# Patient Record
Sex: MALE | Race: BLACK OR AFRICAN AMERICAN | Employment: OTHER | URBAN - METROPOLITAN AREA
[De-identification: names, ages, dates, MRNs, and addresses within clinical notes are randomized per-mention and may not be internally consistent; named-entity substitution may affect disease eponyms.]

---

## 2019-05-24 ENCOUNTER — APPOINTMENT (OUTPATIENT)
Dept: GENERAL RADIOLOGY | Age: 74
End: 2019-05-24
Attending: EMERGENCY MEDICINE
Payer: MEDICARE

## 2019-05-24 ENCOUNTER — HOSPITAL ENCOUNTER (EMERGENCY)
Age: 74
Discharge: HOME | End: 2019-05-24
Attending: EMERGENCY MEDICINE
Payer: MEDICARE

## 2019-05-24 VITALS
DIASTOLIC BLOOD PRESSURE: 83 MMHG | BODY MASS INDEX: 25.77 KG/M2 | WEIGHT: 180 LBS | TEMPERATURE: 98.2 F | SYSTOLIC BLOOD PRESSURE: 135 MMHG | HEART RATE: 88 BPM | OXYGEN SATURATION: 95 % | RESPIRATION RATE: 18 BRPM | HEIGHT: 70 IN

## 2019-05-24 DIAGNOSIS — J18.9 ATYPICAL PNEUMONIA: Primary | ICD-10-CM

## 2019-05-24 LAB
ALBUMIN SERPL-MCNC: 4 G/DL (ref 3.2–4.6)
ALBUMIN/GLOB SERPL: 1.3 {RATIO} (ref 1.2–3.5)
ALP SERPL-CCNC: 86 U/L (ref 50–136)
ALT SERPL-CCNC: 45 U/L (ref 12–65)
ANION GAP SERPL CALC-SCNC: 9 MMOL/L (ref 7–16)
AST SERPL-CCNC: 34 U/L (ref 15–37)
ATRIAL RATE: 119 BPM
BASOPHILS # BLD: 0 K/UL (ref 0–0.2)
BASOPHILS NFR BLD: 0 % (ref 0–2)
BILIRUB SERPL-MCNC: 1.2 MG/DL (ref 0.2–1.1)
BNP SERPL-MCNC: 12 PG/ML
BUN SERPL-MCNC: 13 MG/DL (ref 8–23)
CALCIUM SERPL-MCNC: 8.7 MG/DL (ref 8.3–10.4)
CALCULATED P AXIS, ECG09: 64 DEGREES
CALCULATED R AXIS, ECG10: 69 DEGREES
CALCULATED T AXIS, ECG11: 54 DEGREES
CHLORIDE SERPL-SCNC: 108 MMOL/L (ref 98–107)
CO2 SERPL-SCNC: 25 MMOL/L (ref 21–32)
CREAT SERPL-MCNC: 0.9 MG/DL (ref 0.8–1.5)
D DIMER PPP FEU-MCNC: 0.42 UG/ML(FEU)
DIAGNOSIS, 93000: NORMAL
DIFFERENTIAL METHOD BLD: ABNORMAL
EOSINOPHIL # BLD: 0.2 K/UL (ref 0–0.8)
EOSINOPHIL NFR BLD: 2 % (ref 0.5–7.8)
ERYTHROCYTE [DISTWIDTH] IN BLOOD BY AUTOMATED COUNT: 12.4 % (ref 11.9–14.6)
GLOBULIN SER CALC-MCNC: 3.2 G/DL (ref 2.3–3.5)
GLUCOSE SERPL-MCNC: 116 MG/DL (ref 65–100)
HCT VFR BLD AUTO: 45.8 % (ref 41.1–50.3)
HGB BLD-MCNC: 15.4 G/DL (ref 13.6–17.2)
IMM GRANULOCYTES # BLD AUTO: 0.1 K/UL (ref 0–0.5)
IMM GRANULOCYTES NFR BLD AUTO: 0 % (ref 0–5)
LACTATE BLD-SCNC: 1.64 MMOL/L (ref 0.5–1.9)
LYMPHOCYTES # BLD: 3.1 K/UL (ref 0.5–4.6)
LYMPHOCYTES NFR BLD: 23 % (ref 13–44)
MCH RBC QN AUTO: 33 PG (ref 26.1–32.9)
MCHC RBC AUTO-ENTMCNC: 33.6 G/DL (ref 31.4–35)
MCV RBC AUTO: 98.1 FL (ref 79.6–97.8)
MONOCYTES # BLD: 0.9 K/UL (ref 0.1–1.3)
MONOCYTES NFR BLD: 7 % (ref 4–12)
NEUTS SEG # BLD: 9.4 K/UL (ref 1.7–8.2)
NEUTS SEG NFR BLD: 68 % (ref 43–78)
NRBC # BLD: 0 K/UL (ref 0–0.2)
P-R INTERVAL, ECG05: 178 MS
PLATELET # BLD AUTO: 177 K/UL (ref 150–450)
PMV BLD AUTO: 9.5 FL (ref 9.4–12.3)
POTASSIUM SERPL-SCNC: 3.7 MMOL/L (ref 3.5–5.1)
PROT SERPL-MCNC: 7.2 G/DL (ref 6.3–8.2)
Q-T INTERVAL, ECG07: 324 MS
QRS DURATION, ECG06: 80 MS
QTC CALCULATION (BEZET), ECG08: 455 MS
RBC # BLD AUTO: 4.67 M/UL (ref 4.23–5.6)
SODIUM SERPL-SCNC: 142 MMOL/L (ref 136–145)
TROPONIN I BLD-MCNC: 0 NG/ML (ref 0.02–0.05)
VENTRICULAR RATE, ECG03: 119 BPM
WBC # BLD AUTO: 13.7 K/UL (ref 4.3–11.1)

## 2019-05-24 PROCEDURE — 96365 THER/PROPH/DIAG IV INF INIT: CPT | Performed by: EMERGENCY MEDICINE

## 2019-05-24 PROCEDURE — 71045 X-RAY EXAM CHEST 1 VIEW: CPT

## 2019-05-24 PROCEDURE — 87040 BLOOD CULTURE FOR BACTERIA: CPT

## 2019-05-24 PROCEDURE — 83605 ASSAY OF LACTIC ACID: CPT

## 2019-05-24 PROCEDURE — 74011000258 HC RX REV CODE- 258: Performed by: EMERGENCY MEDICINE

## 2019-05-24 PROCEDURE — 85379 FIBRIN DEGRADATION QUANT: CPT

## 2019-05-24 PROCEDURE — 85025 COMPLETE CBC W/AUTO DIFF WBC: CPT

## 2019-05-24 PROCEDURE — 93005 ELECTROCARDIOGRAM TRACING: CPT | Performed by: EMERGENCY MEDICINE

## 2019-05-24 PROCEDURE — 74011250637 HC RX REV CODE- 250/637: Performed by: EMERGENCY MEDICINE

## 2019-05-24 PROCEDURE — 83880 ASSAY OF NATRIURETIC PEPTIDE: CPT

## 2019-05-24 PROCEDURE — 74011250636 HC RX REV CODE- 250/636: Performed by: EMERGENCY MEDICINE

## 2019-05-24 PROCEDURE — 80053 COMPREHEN METABOLIC PANEL: CPT

## 2019-05-24 PROCEDURE — 96368 THER/DIAG CONCURRENT INF: CPT | Performed by: EMERGENCY MEDICINE

## 2019-05-24 PROCEDURE — 84484 ASSAY OF TROPONIN QUANT: CPT

## 2019-05-24 PROCEDURE — 99285 EMERGENCY DEPT VISIT HI MDM: CPT | Performed by: EMERGENCY MEDICINE

## 2019-05-24 RX ORDER — DOXYCYCLINE 100 MG/1
100 CAPSULE ORAL
Status: COMPLETED | OUTPATIENT
Start: 2019-05-24 | End: 2019-05-24

## 2019-05-24 RX ORDER — LEVOTHYROXINE SODIUM 100 UG/1
50 TABLET ORAL
COMMUNITY

## 2019-05-24 RX ORDER — ALBUTEROL SULFATE 90 UG/1
2 AEROSOL, METERED RESPIRATORY (INHALATION)
Qty: 1 INHALER | Refills: 0 | Status: SHIPPED | OUTPATIENT
Start: 2019-05-24 | End: 2020-04-06

## 2019-05-24 RX ORDER — DOXYCYCLINE HYCLATE 100 MG
100 TABLET ORAL 2 TIMES DAILY
Qty: 20 TAB | Refills: 0 | Status: SHIPPED | OUTPATIENT
Start: 2019-05-24 | End: 2019-06-03

## 2019-05-24 RX ADMIN — DOXYCYCLINE HYCLATE 100 MG: 100 CAPSULE ORAL at 21:20

## 2019-05-24 RX ADMIN — AZITHROMYCIN DIHYDRATE 500 MG: 500 INJECTION, POWDER, LYOPHILIZED, FOR SOLUTION INTRAVENOUS at 21:27

## 2019-05-24 RX ADMIN — CEFTRIAXONE SODIUM 2 G: 2 INJECTION, POWDER, FOR SOLUTION INTRAMUSCULAR; INTRAVENOUS at 21:20

## 2019-05-24 NOTE — ED TRIAGE NOTES
Pt in via EMS from home with complaints of SOB and feeling sick with cough and weakness for a few days. Solumedrol, abuterol, combivent en route.

## 2019-05-24 NOTE — ED PROVIDER NOTES
The history is provided by the patient. Shortness of Breath   This is a new problem. The average episode lasts 4 days. The problem occurs continuously. The problem has been rapidly worsening. Associated symptoms include coryza, rhinorrhea, sore throat, cough, sputum production, chest pain (sharp constant pain for 4 days) and leg swelling. Pertinent negatives include no fever, no neck pain, no hemoptysis, no wheezing, no PND, no orthopnea, no vomiting, no rash and no leg pain. He has tried beta-agonist inhalers for the symptoms. The treatment provided significant relief. He has had no prior hospitalizations. He has had no prior ED visits. He has had no prior ICU admissions. Associated medical issues do not include asthma, COPD, pneumonia, chronic lung disease, PE, CAD, heart failure, past MI, DVT or recent surgery. Past Medical History:   Diagnosis Date    Hypercholesterolemia     Hypertension     Hypothyroid        History reviewed. No pertinent surgical history. History reviewed. No pertinent family history.     Social History     Socioeconomic History    Marital status: Not on file     Spouse name: Not on file    Number of children: Not on file    Years of education: Not on file    Highest education level: Not on file   Occupational History    Not on file   Social Needs    Financial resource strain: Not on file    Food insecurity:     Worry: Not on file     Inability: Not on file    Transportation needs:     Medical: Not on file     Non-medical: Not on file   Tobacco Use    Smoking status: Former Smoker    Smokeless tobacco: Former User   Substance and Sexual Activity    Alcohol use: Not on file    Drug use: Not on file    Sexual activity: Not on file   Lifestyle    Physical activity:     Days per week: Not on file     Minutes per session: Not on file    Stress: Not on file   Relationships    Social connections:     Talks on phone: Not on file     Gets together: Not on file Attends Sikh service: Not on file     Active member of club or organization: Not on file     Attends meetings of clubs or organizations: Not on file     Relationship status: Not on file    Intimate partner violence:     Fear of current or ex partner: Not on file     Emotionally abused: Not on file     Physically abused: Not on file     Forced sexual activity: Not on file   Other Topics Concern    Not on file   Social History Narrative    Not on file         ALLERGIES: Patient has no known allergies. Review of Systems   Constitutional: Negative for chills and fever. HENT: Positive for congestion, rhinorrhea and sore throat. Respiratory: Positive for cough, sputum production and shortness of breath. Negative for hemoptysis and wheezing. Cardiovascular: Positive for chest pain (sharp constant pain for 4 days) and leg swelling. Negative for orthopnea and PND. Gastrointestinal: Negative for nausea and vomiting. Endocrine: Negative for polyuria. Genitourinary: Negative for dysuria. Musculoskeletal: Negative for back pain and neck pain. Skin: Negative for rash. Neurological: Negative for weakness and numbness. Psychiatric/Behavioral: Negative for confusion. The patient is nervous/anxious. Vitals:    05/24/19 1910 05/24/19 1934   BP: 137/72    Pulse: 99    Resp: 19    Temp: 99.3 °F (37.4 °C)    SpO2: 99% 97%   Weight: 81.6 kg (180 lb)    Height: 5' 10\" (1.778 m)             Physical Exam   Constitutional: He is oriented to person, place, and time. He appears well-developed and well-nourished. He appears distressed (tearful and mildly anxious). HENT:   Mouth/Throat: Oropharynx is clear and moist. No oropharyngeal exudate. Eyes: Pupils are equal, round, and reactive to light. Conjunctivae are normal.   Neck: Neck supple. Cardiovascular: Normal rate, regular rhythm and normal heart sounds. Pulmonary/Chest: Effort normal. No accessory muscle usage. No respiratory distress.  He has rales in the right lower field and the left lower field. Abdominal: Soft. Bowel sounds are normal. He exhibits no distension. There is no tenderness. There is no rebound and no guarding. Musculoskeletal: Normal range of motion. He exhibits no edema or tenderness. Right lower leg: Normal. He exhibits no edema. Left lower leg: Normal. He exhibits no edema. Lymphadenopathy:     He has no cervical adenopathy. Neurological: He is alert and oriented to person, place, and time. Skin: Skin is warm and dry. No rash noted. Nursing note and vitals reviewed. MDM  Number of Diagnoses or Management Options  Diagnosis management comments: Quit smoking 20 years ago and was a less than a pack per day smoker off and on for an unspecified number of years. Currently no wheezing. EMS did not tell us that he was wheezing or hypoxic upon arrival and it is not in their note. Patient's vital signs stable currently although mildly hypertensive. Chest x-ray to evaluate for pneumonia or heart failure. Likely just bronchitis possibly with some bronchospasm prior to arrival.  He states he suddenly cannot breathe through his mouth or his nose. Symptoms improved. 8:54 PM  Chest x-ray consistent with a atypical or viral pneumonia. We will cover with doxycycline for pneumonia and atypicals. Albuterol inhaler for wheezing.   D-dimer normal.  Troponin and BNP normal.       Amount and/or Complexity of Data Reviewed  Clinical lab tests: ordered and reviewed (Results for orders placed or performed during the hospital encounter of 05/24/19  -CBC WITH AUTOMATED DIFF       Result                      Value             Ref Range           WBC                         13.7 (H)          4.3 - 11.1 K*       RBC                         4.67              4.23 - 5.6 M*       HGB                         15.4              13.6 - 17.2 *       HCT                         45.8              41.1 - 50.3 %       MCV 98.1 (H)          79.6 - 97.8 *       MCH                         33.0 (H)          26.1 - 32.9 *       MCHC                        33.6              31.4 - 35.0 *       RDW                         12.4              11.9 - 14.6 %       PLATELET                    177               150 - 450 K/*       MPV                         9.5               9.4 - 12.3 FL       ABSOLUTE NRBC               0.00              0.0 - 0.2 K/*       DF                          AUTOMATED                             NEUTROPHILS                 68                43 - 78 %           LYMPHOCYTES                 23                13 - 44 %           MONOCYTES                   7                 4.0 - 12.0 %        EOSINOPHILS                 2                 0.5 - 7.8 %         BASOPHILS                   0                 0.0 - 2.0 %         IMMATURE GRANULOCYTES       0                 0.0 - 5.0 %         ABS. NEUTROPHILS            9.4 (H)           1.7 - 8.2 K/*       ABS. LYMPHOCYTES            3.1               0.5 - 4.6 K/*       ABS. MONOCYTES              0.9               0.1 - 1.3 K/*       ABS. EOSINOPHILS            0.2               0.0 - 0.8 K/*       ABS. BASOPHILS              0.0               0.0 - 0.2 K/*       ABS. IMM.  GRANS.            0.1               0.0 - 0.5 K/*  -METABOLIC PANEL, COMPREHENSIVE       Result                      Value             Ref Range           Sodium                      142               136 - 145 mm*       Potassium                   3.7               3.5 - 5.1 mm*       Chloride                    108 (H)           98 - 107 mmo*       CO2                         25                21 - 32 mmol*       Anion gap                   9                 7 - 16 mmol/L       Glucose                     116 (H)           65 - 100 mg/*       BUN                         13                8 - 23 MG/DL        Creatinine                  0.90              0.8 - 1.5 MG*       GFR est AA >60               >60 ml/min/1*       GFR est non-AA              >60               >60 ml/min/1*       Calcium                     8.7               8.3 - 10.4 M*       Bilirubin, total            1.2 (H)           0.2 - 1.1 MG*       ALT (SGPT)                  45                12 - 65 U/L         AST (SGOT)                  34                15 - 37 U/L         Alk.  phosphatase            86                50 - 136 U/L        Protein, total              7.2               6.3 - 8.2 g/*       Albumin                     4.0               3.2 - 4.6 g/*       Globulin                    3.2               2.3 - 3.5 g/*       A-G Ratio                   1.3               1.2 - 3.5      -BNP       Result                      Value             Ref Range           BNP                         12                pg/mL          -D DIMER       Result                      Value             Ref Range           D DIMER                     0.42              <0.56 ug/ml(*  -POC TROPONIN-I       Result                      Value             Ref Range           Troponin-I (POC)            0 (L)             0.02 - 0.05 *  -EKG, 12 LEAD, INITIAL       Result                      Value             Ref Range           Ventricular Rate            119               BPM                 Atrial Rate                 119               BPM                 P-R Interval                178               ms                  QRS Duration                80                ms                  Q-T Interval                324               ms                  QTC Calculation (Bezet)     455               ms                  Calculated P Axis           64                degrees             Calculated R Axis           69                degrees             Calculated T Axis           54                degrees             Diagnosis                                                     Sinus tachycardia   Otherwise normal ECG   When compared with ECG of 24-MAY-2019 19:51,   No significant change was found   Confirmed by Vinh Rebolledo ([id number]) on 5/24/2019 8:33:49 PM     )  Tests in the radiology section of CPT®: ordered and reviewed (Xr Chest Port    Result Date: 5/24/2019  AP chest radiograph History: sob, 76 years Male Comparison: None available Findings:   Normal cardiomediastinal silhouette. Surgical chain sutures are seen overlying the right lung base. Mild subsegmental atelectasis bilateral lung bases. Nonspecific mild diffuse interstitial prominence. Probable trace right pleural effusion. No evidence of pneumothorax, or air space consolidation. Chronic healed fracture deformity of a right mid posterior lateral rib. Visualized soft tissue and osseous structures otherwise unremarkable.       Impression:  Trace right pleural effusion with nonspecific mild diffuse interstitial prominence.     )           Procedures

## 2019-05-25 LAB
ATRIAL RATE: 106 BPM
CALCULATED P AXIS, ECG09: 71 DEGREES
CALCULATED R AXIS, ECG10: 66 DEGREES
CALCULATED T AXIS, ECG11: 55 DEGREES
DIAGNOSIS, 93000: NORMAL
P-R INTERVAL, ECG05: 200 MS
Q-T INTERVAL, ECG07: 350 MS
QRS DURATION, ECG06: 76 MS
QTC CALCULATION (BEZET), ECG08: 464 MS
VENTRICULAR RATE, ECG03: 106 BPM

## 2019-05-25 NOTE — ED NOTES
I have reviewed discharge instructions with the patient. The patient verbalized understanding. Patient left ED via Discharge Method: ambulatory to Home with  wife). Opportunity for questions and clarification provided. Patient given 2 scripts. To continue your aftercare when you leave the hospital, you may receive an automated call from our care team to check in on how you are doing. This is a free service and part of our promise to provide the best care and service to meet your aftercare needs.  If you have questions, or wish to unsubscribe from this service please call [phone]. Thank you for Choosing our [facility] Emergency Department.

## 2019-05-25 NOTE — DISCHARGE INSTRUCTIONS
Patient Education   Tylenol and ibuprofen for any pains or fevers if needed. Increase fluids. Doxycycline twice daily starting tomorrow for 10 days. Albuterol 2 puffs with spacer chamber every 4 hours as needed for cough wheezing and trouble breathing. Return if any new, worsening or concerning symptoms. Follow-up in 4-5 days if not improving with your primary care doctor. Cough may persist for several weeks. Pneumonia: Care Instructions  Your Care Instructions    Pneumonia is an infection of the lungs. Most cases are caused by infections from bacteria or viruses. Pneumonia may be mild or very severe. If it is caused by bacteria, you will be treated with antibiotics. It may take a few weeks to a few months to recover fully from pneumonia, depending on how sick you were and whether your overall health is good. Follow-up care is a key part of your treatment and safety. Be sure to make and go to all appointments, and call your doctor if you are having problems. It's also a good idea to know your test results and keep a list of the medicines you take. How can you care for yourself at home? · Take your antibiotics exactly as directed. Do not stop taking the medicine just because you are feeling better. You need to take the full course of antibiotics. · Take your medicines exactly as prescribed. Call your doctor if you think you are having a problem with your medicine. · Get plenty of rest and sleep. You may feel weak and tired for a while, but your energy level will improve with time. · To prevent dehydration, drink plenty of fluids, enough so that your urine is light yellow or clear like water. Choose water and other caffeine-free clear liquids until you feel better. If you have kidney, heart, or liver disease and have to limit fluids, talk with your doctor before you increase the amount of fluids you drink.   · Take care of your cough so you can rest. A cough that brings up mucus from your lungs is common with pneumonia. It is one way your body gets rid of the infection. But if coughing keeps you from resting or causes severe fatigue and chest-wall pain, talk to your doctor. He or she may suggest that you take a medicine to reduce the cough. · Use a vaporizer or humidifier to add moisture to your bedroom. Follow the directions for cleaning the machine. · Do not smoke or allow others to smoke around you. Smoke will make your cough last longer. If you need help quitting, talk to your doctor about stop-smoking programs and medicines. These can increase your chances of quitting for good. · Take an over-the-counter pain medicine, such as acetaminophen (Tylenol), ibuprofen (Advil, Motrin), or naproxen (Aleve). Read and follow all instructions on the label. · Do not take two or more pain medicines at the same time unless the doctor told you to. Many pain medicines have acetaminophen, which is Tylenol. Too much acetaminophen (Tylenol) can be harmful. · If you were given a spirometer to measure how well your lungs are working, use it as instructed. This can help your doctor tell how your recovery is going. · To prevent pneumonia in the future, talk to your doctor about getting a flu vaccine (once a year) and a pneumococcal vaccine (one time only for most people). When should you call for help? Call 911 anytime you think you may need emergency care. For example, call if:    · You have severe trouble breathing.    Call your doctor now or seek immediate medical care if:    · You cough up dark brown or bloody mucus (sputum).     · You have new or worse trouble breathing.     · You are dizzy or lightheaded, or you feel like you may faint.    Watch closely for changes in your health, and be sure to contact your doctor if:    · You have a new or higher fever.     · You are coughing more deeply or more often.     · You are not getting better after 2 days (48 hours).     · You do not get better as expected.    Where can you learn more? Go to http://yessica-padma.info/. Enter [id number] in the search box to learn more about \"Pneumonia: Care Instructions. \"  Current as of: September 5, 2018  Content Version: 11.9  © 9004-2317 [employer]. Care instructions adapted under license by [employer] (which disclaims liability or warranty for this information). If you have questions about a medical condition or this instruction, always ask your healthcare professional. [address] any warranty or liability for your use of this information.

## 2019-05-29 LAB
BACTERIA SPEC CULT: NORMAL
BACTERIA SPEC CULT: NORMAL
SERVICE CMNT-IMP: NORMAL
SERVICE CMNT-IMP: NORMAL

## 2020-04-06 ENCOUNTER — HOSPITAL ENCOUNTER (EMERGENCY)
Age: 75
Discharge: HOME | End: 2020-04-06
Payer: MEDICARE

## 2020-04-06 ENCOUNTER — ANESTHESIA EVENT (OUTPATIENT)
Dept: SURGERY | Age: 75
End: 2020-04-06
Payer: MEDICARE

## 2020-04-06 ENCOUNTER — APPOINTMENT (OUTPATIENT)
Dept: GENERAL RADIOLOGY | Age: 75
End: 2020-04-06
Payer: MEDICARE

## 2020-04-06 ENCOUNTER — ANESTHESIA (OUTPATIENT)
Dept: SURGERY | Age: 75
End: 2020-04-06
Payer: MEDICARE

## 2020-04-06 ENCOUNTER — APPOINTMENT (OUTPATIENT)
Dept: CT IMAGING | Age: 75
End: 2020-04-06
Payer: MEDICARE

## 2020-04-06 VITALS
TEMPERATURE: 98.6 F | OXYGEN SATURATION: 95 % | BODY MASS INDEX: 26.77 KG/M2 | WEIGHT: 187 LBS | SYSTOLIC BLOOD PRESSURE: 138 MMHG | HEART RATE: 85 BPM | DIASTOLIC BLOOD PRESSURE: 77 MMHG | HEIGHT: 70 IN | RESPIRATION RATE: 16 BRPM

## 2020-04-06 DIAGNOSIS — D72.829 LEUKOCYTOSIS, UNSPECIFIED TYPE: ICD-10-CM

## 2020-04-06 DIAGNOSIS — R10.31 RLQ ABDOMINAL PAIN: ICD-10-CM

## 2020-04-06 DIAGNOSIS — K35.33 ACUTE APPENDICITIS WITH PERFORATION, LOCALIZED PERITONITIS, AND ABSCESS, WITHOUT GANGRENE: Primary | ICD-10-CM

## 2020-04-06 DIAGNOSIS — R19.7 DIARRHEA, UNSPECIFIED TYPE: ICD-10-CM

## 2020-04-06 DIAGNOSIS — R93.5 ABNORMAL FINDINGS ON DIAGNOSTIC IMAGING OF ABDOMEN: ICD-10-CM

## 2020-04-06 LAB
ALBUMIN SERPL-MCNC: 3.7 G/DL (ref 3.2–4.6)
ALBUMIN/GLOB SERPL: 0.9 {RATIO} (ref 1.2–3.5)
ALP SERPL-CCNC: 72 U/L (ref 50–136)
ALT SERPL-CCNC: 15 U/L (ref 12–65)
ANION GAP SERPL CALC-SCNC: 7 MMOL/L (ref 7–16)
AST SERPL-CCNC: 17 U/L (ref 15–37)
BASOPHILS # BLD: 0 K/UL (ref 0–0.2)
BASOPHILS NFR BLD: 0 % (ref 0–2)
BILIRUB SERPL-MCNC: 2 MG/DL (ref 0.2–1.1)
BUN SERPL-MCNC: 10 MG/DL (ref 8–23)
CALCIUM SERPL-MCNC: 9.3 MG/DL (ref 8.3–10.4)
CHLORIDE SERPL-SCNC: 104 MMOL/L (ref 98–107)
CO2 SERPL-SCNC: 29 MMOL/L (ref 21–32)
CREAT SERPL-MCNC: 0.9 MG/DL (ref 0.8–1.5)
DIFFERENTIAL METHOD BLD: ABNORMAL
EOSINOPHIL # BLD: 0.1 K/UL (ref 0–0.8)
EOSINOPHIL NFR BLD: 1 % (ref 0.5–7.8)
ERYTHROCYTE [DISTWIDTH] IN BLOOD BY AUTOMATED COUNT: 12.3 % (ref 11.9–14.6)
GLOBULIN SER CALC-MCNC: 4.2 G/DL (ref 2.3–3.5)
GLUCOSE SERPL-MCNC: 99 MG/DL (ref 65–100)
HCT VFR BLD AUTO: 48 % (ref 41.1–50.3)
HGB BLD-MCNC: 15.9 G/DL (ref 13.6–17.2)
IMM GRANULOCYTES # BLD AUTO: 0.1 K/UL (ref 0–0.5)
IMM GRANULOCYTES NFR BLD AUTO: 1 % (ref 0–5)
LIPASE SERPL-CCNC: 267 U/L (ref 73–393)
LYMPHOCYTES # BLD: 2.2 K/UL (ref 0.5–4.6)
LYMPHOCYTES NFR BLD: 16 % (ref 13–44)
MCH RBC QN AUTO: 31.4 PG (ref 26.1–32.9)
MCHC RBC AUTO-ENTMCNC: 33.1 G/DL (ref 31.4–35)
MCV RBC AUTO: 94.7 FL (ref 79.6–97.8)
MONOCYTES # BLD: 1.1 K/UL (ref 0.1–1.3)
MONOCYTES NFR BLD: 8 % (ref 4–12)
NEUTS SEG # BLD: 10.2 K/UL (ref 1.7–8.2)
NEUTS SEG NFR BLD: 74 % (ref 43–78)
NRBC # BLD: 0 K/UL (ref 0–0.2)
PLATELET # BLD AUTO: 265 K/UL (ref 150–450)
PMV BLD AUTO: 8.9 FL (ref 9.4–12.3)
POTASSIUM SERPL-SCNC: 3.5 MMOL/L (ref 3.5–5.1)
PROT SERPL-MCNC: 7.9 G/DL (ref 6.3–8.2)
RBC # BLD AUTO: 5.07 M/UL (ref 4.23–5.6)
SODIUM SERPL-SCNC: 140 MMOL/L (ref 136–145)
WBC # BLD AUTO: 13.7 K/UL (ref 4.3–11.1)

## 2020-04-06 PROCEDURE — 99283 EMERGENCY DEPT VISIT LOW MDM: CPT

## 2020-04-06 PROCEDURE — 76060000034 HC ANESTHESIA 1.5 TO 2 HR: Performed by: SURGERY

## 2020-04-06 PROCEDURE — 77030022703 HC LIGASURE  BLNT LAPSCP SEAL COVD -E: Performed by: SURGERY

## 2020-04-06 PROCEDURE — 74011250636 HC RX REV CODE- 250/636: Performed by: NURSE PRACTITIONER

## 2020-04-06 PROCEDURE — 77030007955 HC PCH ENDOSC SPEC J&J -B: Performed by: SURGERY

## 2020-04-06 PROCEDURE — 74011000250 HC RX REV CODE- 250

## 2020-04-06 PROCEDURE — 74177 CT ABD & PELVIS W/CONTRAST: CPT

## 2020-04-06 PROCEDURE — 81003 URINALYSIS AUTO W/O SCOPE: CPT

## 2020-04-06 PROCEDURE — 80053 COMPREHEN METABOLIC PANEL: CPT

## 2020-04-06 PROCEDURE — 74011636320 HC RX REV CODE- 636/320

## 2020-04-06 PROCEDURE — 83690 ASSAY OF LIPASE: CPT

## 2020-04-06 PROCEDURE — 74011250636 HC RX REV CODE- 250/636: Performed by: NURSE ANESTHETIST, CERTIFIED REGISTERED

## 2020-04-06 PROCEDURE — 77030008608 HC TRCR ENDOSC SMTH AMR -B: Performed by: SURGERY

## 2020-04-06 PROCEDURE — 77030003578 HC NDL INSUF VERES AMR -B: Performed by: SURGERY

## 2020-04-06 PROCEDURE — 74011000250 HC RX REV CODE- 250: Performed by: NURSE ANESTHETIST, CERTIFIED REGISTERED

## 2020-04-06 PROCEDURE — 77030008522 HC TBNG INSUF LAPRO STRY -B: Performed by: SURGERY

## 2020-04-06 PROCEDURE — 77030000038 HC TIP SCIS LAPSCP SURI -B: Performed by: SURGERY

## 2020-04-06 PROCEDURE — 96374 THER/PROPH/DIAG INJ IV PUSH: CPT

## 2020-04-06 PROCEDURE — 74011250636 HC RX REV CODE- 250/636: Performed by: ANESTHESIOLOGY

## 2020-04-06 PROCEDURE — 88304 TISSUE EXAM BY PATHOLOGIST: CPT

## 2020-04-06 PROCEDURE — 77030037088 HC TUBE ENDOTRACH ORAL NSL COVD-A: Performed by: ANESTHESIOLOGY

## 2020-04-06 PROCEDURE — 77030008756 HC TU IRR SUC STRY -B: Performed by: SURGERY

## 2020-04-06 PROCEDURE — 74011000258 HC RX REV CODE- 258

## 2020-04-06 PROCEDURE — 77030039425 HC BLD LARYNG TRULITE DISP TELE -A: Performed by: ANESTHESIOLOGY

## 2020-04-06 PROCEDURE — 77030011810 HC STPLR ENDOSC J&J -G: Performed by: SURGERY

## 2020-04-06 PROCEDURE — 44970 LAPAROSCOPY APPENDECTOMY: CPT | Performed by: SURGERY

## 2020-04-06 PROCEDURE — 76010000162 HC OR TIME 1.5 TO 2 HR INTENSV-TIER 1: Performed by: SURGERY

## 2020-04-06 PROCEDURE — 76210000017 HC OR PH I REC 1.5 TO 2 HR: Performed by: SURGERY

## 2020-04-06 PROCEDURE — 76210000020 HC REC RM PH II FIRST 0.5 HR: Performed by: SURGERY

## 2020-04-06 PROCEDURE — 77030031139 HC SUT VCRL2 J&J -A: Performed by: SURGERY

## 2020-04-06 PROCEDURE — 96375 TX/PRO/DX INJ NEW DRUG ADDON: CPT

## 2020-04-06 PROCEDURE — 77030040361 HC SLV COMPR DVT MDII -B: Performed by: SURGERY

## 2020-04-06 PROCEDURE — 77030018836 HC SOL IRR NACL ICUM -A: Performed by: SURGERY

## 2020-04-06 PROCEDURE — 77030009967 HC RELD STPLR ENDOSC J&J -C: Performed by: SURGERY

## 2020-04-06 PROCEDURE — 77030020829: Performed by: SURGERY

## 2020-04-06 PROCEDURE — 77030008606 HC TRCR ENDOSC KII AMR -B: Performed by: SURGERY

## 2020-04-06 PROCEDURE — 85025 COMPLETE CBC W/AUTO DIFF WBC: CPT

## 2020-04-06 PROCEDURE — 99223 1ST HOSP IP/OBS HIGH 75: CPT | Performed by: SURGERY

## 2020-04-06 PROCEDURE — 74011250636 HC RX REV CODE- 250/636

## 2020-04-06 RX ORDER — LISINOPRIL 10 MG/1
TABLET ORAL DAILY
COMMUNITY

## 2020-04-06 RX ORDER — SODIUM CHLORIDE 0.9 % (FLUSH) 0.9 %
5-40 SYRINGE (ML) INJECTION EVERY 8 HOURS
Status: CANCELLED | OUTPATIENT
Start: 2020-04-06

## 2020-04-06 RX ORDER — OXYCODONE AND ACETAMINOPHEN 5; 325 MG/1; MG/1
1 TABLET ORAL
Qty: 28 TAB | Refills: 0 | Status: SHIPPED | OUTPATIENT
Start: 2020-04-06 | End: 2020-04-13

## 2020-04-06 RX ORDER — ONDANSETRON 2 MG/ML
4 INJECTION INTRAMUSCULAR; INTRAVENOUS
Status: COMPLETED | OUTPATIENT
Start: 2020-04-06 | End: 2020-04-06

## 2020-04-06 RX ORDER — FENTANYL CITRATE 50 UG/ML
INJECTION, SOLUTION INTRAMUSCULAR; INTRAVENOUS AS NEEDED
Status: DISCONTINUED | OUTPATIENT
Start: 2020-04-06 | End: 2020-04-06 | Stop reason: HOSPADM

## 2020-04-06 RX ORDER — LIDOCAINE HYDROCHLORIDE 20 MG/ML
INJECTION, SOLUTION EPIDURAL; INFILTRATION; INTRACAUDAL; PERINEURAL AS NEEDED
Status: DISCONTINUED | OUTPATIENT
Start: 2020-04-06 | End: 2020-04-06 | Stop reason: HOSPADM

## 2020-04-06 RX ORDER — SODIUM CHLORIDE 0.9 % (FLUSH) 0.9 %
5-40 SYRINGE (ML) INJECTION AS NEEDED
Status: CANCELLED | OUTPATIENT
Start: 2020-04-06

## 2020-04-06 RX ORDER — ROCURONIUM BROMIDE 10 MG/ML
INJECTION, SOLUTION INTRAVENOUS AS NEEDED
Status: DISCONTINUED | OUTPATIENT
Start: 2020-04-06 | End: 2020-04-06 | Stop reason: HOSPADM

## 2020-04-06 RX ORDER — HYDROCORTISONE SODIUM SUCCINATE 100 MG/2ML
100 INJECTION, POWDER, FOR SOLUTION INTRAMUSCULAR; INTRAVENOUS ONCE
Status: COMPLETED | OUTPATIENT
Start: 2020-04-06 | End: 2020-04-06

## 2020-04-06 RX ORDER — MORPHINE SULFATE 2 MG/ML
2 INJECTION, SOLUTION INTRAMUSCULAR; INTRAVENOUS
Status: COMPLETED | OUTPATIENT
Start: 2020-04-06 | End: 2020-04-06

## 2020-04-06 RX ORDER — ONDANSETRON 2 MG/ML
4 INJECTION INTRAMUSCULAR; INTRAVENOUS
Status: CANCELLED | OUTPATIENT
Start: 2020-04-06

## 2020-04-06 RX ORDER — METRONIDAZOLE 500 MG/100ML
500 INJECTION, SOLUTION INTRAVENOUS ONCE
Status: COMPLETED | OUTPATIENT
Start: 2020-04-06 | End: 2020-04-06

## 2020-04-06 RX ORDER — HYDROMORPHONE HYDROCHLORIDE 2 MG/ML
1 INJECTION, SOLUTION INTRAMUSCULAR; INTRAVENOUS; SUBCUTANEOUS
Status: DISCONTINUED | OUTPATIENT
Start: 2020-04-06 | End: 2020-04-06 | Stop reason: HOSPADM

## 2020-04-06 RX ORDER — PROPOFOL 10 MG/ML
INJECTION, EMULSION INTRAVENOUS AS NEEDED
Status: DISCONTINUED | OUTPATIENT
Start: 2020-04-06 | End: 2020-04-06 | Stop reason: HOSPADM

## 2020-04-06 RX ORDER — NEOSTIGMINE METHYLSULFATE 1 MG/ML
INJECTION, SOLUTION INTRAVENOUS AS NEEDED
Status: DISCONTINUED | OUTPATIENT
Start: 2020-04-06 | End: 2020-04-06 | Stop reason: HOSPADM

## 2020-04-06 RX ORDER — LIDOCAINE HYDROCHLORIDE 10 MG/ML
0.1 INJECTION INFILTRATION; PERINEURAL AS NEEDED
Status: DISCONTINUED | OUTPATIENT
Start: 2020-04-06 | End: 2020-04-06 | Stop reason: HOSPADM

## 2020-04-06 RX ORDER — HYDROMORPHONE HYDROCHLORIDE 2 MG/ML
0.5 INJECTION, SOLUTION INTRAMUSCULAR; INTRAVENOUS; SUBCUTANEOUS
Status: DISCONTINUED | OUTPATIENT
Start: 2020-04-06 | End: 2020-04-06 | Stop reason: HOSPADM

## 2020-04-06 RX ORDER — ONDANSETRON 2 MG/ML
INJECTION INTRAMUSCULAR; INTRAVENOUS AS NEEDED
Status: DISCONTINUED | OUTPATIENT
Start: 2020-04-06 | End: 2020-04-06 | Stop reason: HOSPADM

## 2020-04-06 RX ORDER — ATORVASTATIN CALCIUM 10 MG/1
10 TABLET, FILM COATED ORAL DAILY
COMMUNITY

## 2020-04-06 RX ORDER — EPHEDRINE SULFATE/0.9% NACL/PF 50 MG/5 ML
SYRINGE (ML) INTRAVENOUS AS NEEDED
Status: DISCONTINUED | OUTPATIENT
Start: 2020-04-06 | End: 2020-04-06 | Stop reason: HOSPADM

## 2020-04-06 RX ORDER — CEFAZOLIN SODIUM/WATER 2 G/20 ML
2 SYRINGE (ML) INTRAVENOUS ONCE
Status: COMPLETED | OUTPATIENT
Start: 2020-04-06 | End: 2020-04-06

## 2020-04-06 RX ORDER — SODIUM CHLORIDE, SODIUM LACTATE, POTASSIUM CHLORIDE, CALCIUM CHLORIDE 600; 310; 30; 20 MG/100ML; MG/100ML; MG/100ML; MG/100ML
125 INJECTION, SOLUTION INTRAVENOUS CONTINUOUS
Status: DISCONTINUED | OUTPATIENT
Start: 2020-04-06 | End: 2020-04-06 | Stop reason: HOSPADM

## 2020-04-06 RX ORDER — GLYCOPYRROLATE 0.2 MG/ML
INJECTION INTRAMUSCULAR; INTRAVENOUS AS NEEDED
Status: DISCONTINUED | OUTPATIENT
Start: 2020-04-06 | End: 2020-04-06 | Stop reason: HOSPADM

## 2020-04-06 RX ORDER — KETOROLAC TROMETHAMINE 30 MG/ML
INJECTION, SOLUTION INTRAMUSCULAR; INTRAVENOUS AS NEEDED
Status: DISCONTINUED | OUTPATIENT
Start: 2020-04-06 | End: 2020-04-06 | Stop reason: HOSPADM

## 2020-04-06 RX ORDER — DEXAMETHASONE SODIUM PHOSPHATE 4 MG/ML
INJECTION, SOLUTION INTRA-ARTICULAR; INTRALESIONAL; INTRAMUSCULAR; INTRAVENOUS; SOFT TISSUE AS NEEDED
Status: DISCONTINUED | OUTPATIENT
Start: 2020-04-06 | End: 2020-04-06 | Stop reason: HOSPADM

## 2020-04-06 RX ORDER — SODIUM CHLORIDE 0.9 % (FLUSH) 0.9 %
10 SYRINGE (ML) INJECTION
Status: COMPLETED | OUTPATIENT
Start: 2020-04-06 | End: 2020-04-06

## 2020-04-06 RX ORDER — NALOXONE HYDROCHLORIDE 0.4 MG/ML
0.1 INJECTION, SOLUTION INTRAMUSCULAR; INTRAVENOUS; SUBCUTANEOUS AS NEEDED
Status: DISCONTINUED | OUTPATIENT
Start: 2020-04-06 | End: 2020-04-06 | Stop reason: HOSPADM

## 2020-04-06 RX ADMIN — SODIUM CHLORIDE 100 ML: 900 INJECTION, SOLUTION INTRAVENOUS at 10:50

## 2020-04-06 RX ADMIN — SODIUM CHLORIDE, SODIUM LACTATE, POTASSIUM CHLORIDE, AND CALCIUM CHLORIDE: 600; 310; 30; 20 INJECTION, SOLUTION INTRAVENOUS at 14:40

## 2020-04-06 RX ADMIN — IOPAMIDOL 100 ML: 755 INJECTION, SOLUTION INTRAVENOUS at 10:49

## 2020-04-06 RX ADMIN — Medication 2 G: at 13:35

## 2020-04-06 RX ADMIN — Medication 10 ML: at 10:50

## 2020-04-06 RX ADMIN — ONDANSETRON 4 MG: 2 INJECTION INTRAMUSCULAR; INTRAVENOUS at 12:42

## 2020-04-06 RX ADMIN — FENTANYL CITRATE 100 MCG: 50 INJECTION INTRAMUSCULAR; INTRAVENOUS at 13:43

## 2020-04-06 RX ADMIN — Medication 10 MG: at 13:56

## 2020-04-06 RX ADMIN — METRONIDAZOLE 500 MG: 500 INJECTION, SOLUTION INTRAVENOUS at 13:47

## 2020-04-06 RX ADMIN — Medication 3 MG: at 14:55

## 2020-04-06 RX ADMIN — RACEPINEPHRINE HYDROCHLORIDE 0.5 ML: 11.25 SOLUTION RESPIRATORY (INHALATION) at 15:29

## 2020-04-06 RX ADMIN — FENTANYL CITRATE 100 MCG: 50 INJECTION INTRAMUSCULAR; INTRAVENOUS at 14:01

## 2020-04-06 RX ADMIN — HYDROCORTISONE SODIUM SUCCINATE 100 MG: 100 INJECTION, POWDER, FOR SOLUTION INTRAMUSCULAR; INTRAVENOUS at 14:50

## 2020-04-06 RX ADMIN — MORPHINE SULFATE 2 MG: 2 INJECTION, SOLUTION INTRAMUSCULAR; INTRAVENOUS at 12:43

## 2020-04-06 RX ADMIN — ONDANSETRON 4 MG: 2 INJECTION INTRAMUSCULAR; INTRAVENOUS at 13:59

## 2020-04-06 RX ADMIN — LIDOCAINE HYDROCHLORIDE 100 MG: 20 INJECTION, SOLUTION EPIDURAL; INFILTRATION; INTRACAUDAL; PERINEURAL at 13:43

## 2020-04-06 RX ADMIN — PROPOFOL 200 MG: 10 INJECTION, EMULSION INTRAVENOUS at 13:43

## 2020-04-06 RX ADMIN — GLYCOPYRROLATE 0.4 MG: 0.2 INJECTION, SOLUTION INTRAMUSCULAR; INTRAVENOUS at 14:55

## 2020-04-06 RX ADMIN — ROCURONIUM BROMIDE 50 MG: 10 INJECTION, SOLUTION INTRAVENOUS at 13:43

## 2020-04-06 RX ADMIN — DEXAMETHASONE SODIUM PHOSPHATE 5 MG: 4 INJECTION, SOLUTION INTRAMUSCULAR; INTRAVENOUS at 13:58

## 2020-04-06 RX ADMIN — KETOROLAC TROMETHAMINE 30 MG: 30 INJECTION, SOLUTION INTRAMUSCULAR; INTRAVENOUS at 14:58

## 2020-04-06 RX ADMIN — SODIUM CHLORIDE, SODIUM LACTATE, POTASSIUM CHLORIDE, AND CALCIUM CHLORIDE 125 ML/HR: 600; 310; 30; 20 INJECTION, SOLUTION INTRAVENOUS at 13:01

## 2020-04-06 NOTE — H&P
H&P/Consult Note/Progress Note/Office Note:   Umberto Hunter  MRN: [id number]  :1945  Age:75 y.o.    HPI: Umberto Hunter is a 76 y.o. male who we are asked by Dr. Sean Pearl to see for acute appendicits. The patient presented with a 3 day history of RLQ pain. He was seen by his PCP via telemedicine who treated him for constipation. Pain was 10/10. Pain is worse with movement or sitting. Pain is better with not moving or sitting still. No fever, chills. No nausea or vomiting. His pain did not improve so he came to the ER. ER workup showed WBC 13,700, AF. Denies fever, chills. Reports diarrhea. CT positive for acute appendicitis. No similar pain in the past. No previous colonoscopy. His father had history of colon cancer at age 28. Past Medical History:   Diagnosis Date    Hypercholesterolemia     Hypertension     Hypothyroid      Past Surgical History:   Procedure Laterality Date    CHEST SURGERY PROCEDURE UNLISTED      lung mass on right benign    HX UROLOGICAL       Current Facility-Administered Medications   Medication Dose Route Frequency    ceFAZolin (ANCEF) 2 g/20 mL in sterile water IV syringe  2 g IntraVENous ONCE    metroNIDAZOLE (FLAGYL) IVPB premix 500 mg  500 mg IntraVENous ONCE    HYDROmorphone (PF) (DILAUDID) injection 1 mg  1 mg IntraVENous Q4H PRN    piperacillin-tazobactam (ZOSYN) 4.5 g in 0.9% sodium chloride (MBP/ADV) 100 mL  4.5 g IntraVENous NOW    lidocaine (XYLOCAINE) 10 mg/mL (1 %) injection 0.1 mL  0.1 mL SubCUTAneous PRN    lactated Ringers infusion  125 mL/hr IntraVENous CONTINUOUS     Patient has no known allergies.   Social History     Socioeconomic History    Marital status:      Spouse name: Not on file    Number of children: Not on file    Years of education: Not on file    Highest education level: Not on file   Tobacco Use    Smoking status: Former Smoker    Smokeless tobacco: Former User   Substance and Sexual Activity    Alcohol use: Never     Frequency: Never    Drug use: Never     Social History     Tobacco Use   Smoking Status Former Smoker   Smokeless Tobacco Former User     History reviewed. No pertinent family history. ROS: The patient has no difficulty with chest pain or shortness of breath. No fever or chills. Comprehensive review of systems was otherwise unremarkable except as noted above. Physical Exam:   Visit Vitals  /83 (BP 1 Location: Right arm, BP Patient Position: At rest)   Pulse 93   Temp 98.9 °F (37.2 °C)   Resp 16   Ht 5' 10\" (1.778 m)   Wt 187 lb (84.8 kg)   SpO2 96%   BMI 26.83 kg/m²     Constitutional: Alert, oriented, cooperative patient in no acute distress; appears stated age    Eyes:Sclera are clear. EOMs intact  ENMT: no external lesions gross hearing normal; no obvious neck masses, no ear or lip lesions, nares normal  CV: RRR. Normal perfusion  Resp: No JVD. Breathing is  non-labored; no audible wheezing. GI: soft and non-distended, tender to palpation in  RLQ  Musculoskeletal: unremarkable with normal function. No embolic signs or cyanosis.    Neuro:  Oriented; moves all 4; no focal deficits  Psychiatric: normal affect and mood, no memory impairment    Recent vitals (if inpt):  Patient Vitals for the past 24 hrs:   BP Temp Pulse Resp SpO2 Height Weight   04/06/20 0902 -- -- -- -- 96 % -- --   04/06/20 0845 159/83 98.9 °F (37.2 °C) 93 16 96 % 5' 10\" (1.778 m) 187 lb (84.8 kg)       Labs:  Recent Labs     04/06/20  0906   WBC 13.7*   HGB 15.9         K 3.5      CO2 29   BUN 10   CREA 0.90   GLU 99   TBILI 2.0*   SGOT 17   ALT 15   AP 72   LPSE 267       Lab Results   Component Value Date/Time    WBC 13.7 (H) 04/06/2020 09:06 AM    HGB 15.9 04/06/2020 09:06 AM    PLATELET 597 26/01/7804 09:06 AM    Sodium 140 04/06/2020 09:06 AM    Potassium 3.5 04/06/2020 09:06 AM    Chloride 104 04/06/2020 09:06 AM    CO2 29 04/06/2020 09:06 AM    BUN 10 04/06/2020 09:06 AM    Creatinine 0.90 04/06/2020 09:06 AM Glucose 99 04/06/2020 09:06 AM    Bilirubin, total 2.0 (H) 04/06/2020 09:06 AM    AST (SGOT) 17 04/06/2020 09:06 AM    ALT (SGPT) 15 04/06/2020 09:06 AM    Alk. phosphatase 72 04/06/2020 09:06 AM    Lipase 267 04/06/2020 09:06 AM       I reviewed recent labs and recent radiologic studies. CT Results (most recent):  Results from Hospital Encounter encounter on 04/06/20   CT ABD PELV W CONT    Narrative CT ABDOMEN AND PELVIS WITH INTRAVENOUS CONTRAST DATED 4/6/2020. History: Right lower quadrant pain. Comparison: None. Technique:   Multiple contiguous helical CT images reconstructed at 5 mm  intervals were obtained from above the diaphragms through the ischial  tuberosities following 100 cc Isovue-370 without acute complication. All CT  scans performed at this facility use one or all of the following: Automated  exposure control, adjustment of the mA and/or kVp according to patient's size,  iterative reconstruction. Findings:  CT ABDOMEN:    Limited evaluation of the lung bases and base of the mediastinum demonstrates no  significant abnormalities. Only mild chronic appearing changes are seen at the  right lung base which may be related to prior surgery given that a faint suture  line is seen involving the right middle lobe. The Liver is homogeneous in attenuation. The spleen is homogeneous in  attenuation. No contour deforming or enhancing mass lesions are seen of the  pancreas or adrenal glands. The gallbladder has an unremarkable CT appearance  without radiopaque stones or pericholecystic fluid/inflammatory changes. The  kidneys enhance symmetrically and no evidence of hydronephrosis is seen. The visualized loops of small bowel and colon are normal in caliber. Right lower  quadrant inflammatory changes are seen appreciated on axial image 52.  Centrally  in this area of inflammation is a blind-ending structure felt to represent the  appendix which is thick-walled and dilated measuring up to 2.3 cm in diameter. The appearance is consistent with acute appendicitis. A defined enhancing  collection is seen along the inferior aspect of the inflamed appendix best  appreciated on axial image 52, and coronal reconstructed image 43 which measures  4.2 cm x 2.1 cm x 1.3 cm in greatest transverse dimensions. This appears to  represent an enhancing fluid collection with an appearance raising concern for a  small associated abscess. No free air is seen. Likely reactive wall thickening  is seen of the directly adjacent ascending colon. . Mild to moderate  diverticulosis is seen throughout the colon. No adenopathy is seen. The  abdominal aorta demonstrates minimal atherosclerotic calcification. CT PELVIS:  No abnormal pelvic fluid collections or inflammatory changes are present. No  pelvic adenopathy is seen. The urinary bladder is unremarkable. A small  fat-containing left inguinal hernia is seen. Impression IMPRESSION:    1. Findings consistent with acute appendicitis. No free air is seen. However,  there is a small irregularly shaped enhancing fluid collection along the  inferior aspect of the appendix measuring 4.2 cm x 2.1 cm x 1.3 cm in greatest  transverse dimensions for which a small associated abscess cannot be excluded. I independently reviewed radiology images for studies I described above or studies I have ordered. There is an inflamed appendix with possible abscess. Admission date (for inpatients): 4/6/2020     ASSESSMENT/PLAN:      ICD-10-CM ICD-9-CM    1. Acute appendicitis with perforation, localized peritonitis, and abscess, without gangrene K35.33 540.1    2. Leukocytosis, unspecified type D72.829 288.60    3. RLQ abdominal pain R10.31 789.03    4. Diarrhea, unspecified type R19.7 787.91    5.  Abnormal findings on diagnostic imaging of abdomen R93.5 793.6         Admit for surgery  NPO  IVF  ABX on call to OR  Obtain consent  SCD for prophylaxis    I recommend a laparoscopic, possible open, appendectomy. I discussed risks, benefits and alternatives of surgery. Patient understood and agreed to proceed with appendectomy. We discussed risks of appendectomy including but not limited to pain, infection, bleeding, scar, conversion from laparoscopic to open, injury to organs, enterotomy, cautery injury, staple leak, hernia, need for additional procedures, change in bowel habits.       Robe Bland MD  Bariatric & Minimally Invasive Surgery  [facility]  4/6/2020 1:20 PM

## 2020-04-06 NOTE — ANESTHESIA POSTPROCEDURE EVALUATION
Procedure(s):  APPENDECTOMY LAPAROSCOPIC, DRAINAGE OF ABSCESS.     general    Anesthesia Post Evaluation        Patient location during evaluation: PACU  Patient participation: complete - patient participated  Level of consciousness: responsive to verbal stimuli  Pain management: adequate  Airway patency: patent  Anesthetic complications: no  Cardiovascular status: hemodynamically stable  Respiratory status: spontaneous ventilation and nonlabored ventilation  Hydration status: euvolemic  Post anesthesia nausea and vomiting:  none      Vitals Value Taken Time   /74 4/6/2020  4:09 PM   Temp 37 °C (98.6 °F) 4/6/2020  3:24 PM   Pulse 77 4/6/2020  4:09 PM   Resp 16 4/6/2020  4:09 PM   SpO2 96 % 4/6/2020  4:09 PM

## 2020-04-06 NOTE — BRIEF OP NOTE
Brief Postoperative Note    Patient: Carlitos Mackenzie  YOB: 1945  MRN: [id number]    Date of Procedure: 4/6/2020     Pre-Op Diagnosis: Appendicitis    Post-Op Diagnosis: Contained perforated appendicitis    Procedure(s):  APPENDECTOMY LAPAROSCOPIC, DRAINAGE OF ABSCESS    Surgeon(s):  Daniela Damon MD    Surgical Assistant: None    Anesthesia: General     Estimated Blood Loss (mL): Minimal    Complications: None    Specimens:   ID Type Source Tests Collected by Time Destination   1 : appendix Preservative Appendix  Daniela Damon MD 4/6/2020 1340 Pathology        Implants: * No implants in log *    Drains: * No LDAs found *    Findings: acute appendicitis with contained perforation and abscess.      Electronically Signed by Karon Menendez MD on 4/6/2020 at 3:12 PM

## 2020-04-06 NOTE — ED PROVIDER NOTES
20-year-old male right lower quadrant pain started 2 days ago. No fevers or chills. Patient has had diarrhea. Abdominal Pain    This is a new problem. The current episode started 2 days ago. The problem occurs constantly. The problem has not changed since onset. The pain is associated with an unknown factor. The pain is located in the RLQ. The quality of the pain is sharp and cramping. The pain is at a severity of 9/10. The pain is severe. Nothing worsens the pain. The pain is relieved by nothing. Past Medical History:   Diagnosis Date    Hypercholesterolemia     Hypertension     Hypothyroid        No past surgical history on file. No family history on file.     Social History     Socioeconomic History    Marital status:      Spouse name: Not on file    Number of children: Not on file    Years of education: Not on file    Highest education level: Not on file   Occupational History    Not on file   Social Needs    Financial resource strain: Not on file    Food insecurity     Worry: Not on file     Inability: Not on file    Transportation needs     Medical: Not on file     Non-medical: Not on file   Tobacco Use    Smoking status: Former Smoker    Smokeless tobacco: Former User   Substance and Sexual Activity    Alcohol use: Not on file    Drug use: Not on file    Sexual activity: Not on file   Lifestyle    Physical activity     Days per week: Not on file     Minutes per session: Not on file    Stress: Not on file   Relationships    Social connections     Talks on phone: Not on file     Gets together: Not on file     Attends Yazdanism service: Not on file     Active member of club or organization: Not on file     Attends meetings of clubs or organizations: Not on file     Relationship status: Not on file    Intimate partner violence     Fear of current or ex partner: Not on file     Emotionally abused: Not on file     Physically abused: Not on file     Forced sexual activity: Not on file   Other Topics Concern    Not on file   Social History Narrative    Not on file         ALLERGIES: Patient has no known allergies. Review of Systems   Constitutional: Negative. Negative for activity change. HENT: Negative. Eyes: Negative. Respiratory: Negative. Cardiovascular: Negative. Gastrointestinal: Positive for abdominal pain. Genitourinary: Negative. Musculoskeletal: Negative. Skin: Negative. Neurological: Negative. Psychiatric/Behavioral: Negative. All other systems reviewed and are negative. There were no vitals filed for this visit. Physical Exam  Vitals signs and nursing note reviewed. Constitutional:       General: He is not in acute distress. Appearance: He is well-developed. He is not diaphoretic. HENT:      Head: Normocephalic and atraumatic. Right Ear: External ear normal.      Left Ear: External ear normal.      Nose: Nose normal.      Mouth/Throat:      Pharynx: No oropharyngeal exudate. Eyes:      General: No scleral icterus. Right eye: No discharge. Left eye: No discharge. Conjunctiva/sclera: Conjunctivae normal.      Pupils: Pupils are equal, round, and reactive to light. Neck:      Musculoskeletal: Normal range of motion and neck supple. Vascular: No JVD. Trachea: No tracheal deviation. Cardiovascular:      Rate and Rhythm: Normal rate and regular rhythm. Pulmonary:      Effort: Pulmonary effort is normal. No respiratory distress. Breath sounds: Normal breath sounds. No stridor. No wheezing. Chest:      Chest wall: No tenderness. Abdominal:      General: Bowel sounds are normal. There is no distension. Palpations: Abdomen is soft. There is no mass. Tenderness: There is abdominal tenderness in the right lower quadrant. There is guarding. Musculoskeletal: Normal range of motion. General: No tenderness. Skin:     General: Skin is warm and dry. Coloration: Skin is not pale. Findings: No erythema or rash. Neurological:      Mental Status: He is alert and oriented to person, place, and time. Cranial Nerves: No cranial nerve deficit. Psychiatric:         Behavior: Behavior normal.         Thought Content: Thought content normal.          MDM  Number of Diagnoses or Management Options  Diagnosis management comments: CT findings for appendicitis surgery contacted they will take over the case and prepare the patient for surgery.        Amount and/or Complexity of Data Reviewed  Clinical lab tests: ordered and reviewed  Tests in the radiology section of CPT®: ordered and reviewed  Tests in the medicine section of CPT®: ordered and reviewed    Risk of Complications, Morbidity, and/or Mortality  Presenting problems: high  Diagnostic procedures: high  Management options: high    Patient Progress  Patient progress: stable         Procedures

## 2020-04-06 NOTE — ED TRIAGE NOTES
Patient advises that he started with right lower abdominal pain with diarrhea and black stools 3 days ago. Patient advises pain does radiate to entire right side however worse to lower area. Patient advises no change in urinary habits.

## 2020-04-06 NOTE — PROGRESS NOTES
Visited with patient as no PCP listed in chart. Demographics on face sheet verified. Patient states that her PCP is Dr. Yulissa Seymour @ [address] [place] [address]. Last visit was 4/1/20. No further needs at this time.

## 2020-04-06 NOTE — DISCHARGE INSTRUCTIONS
No bathtub or pool for 2 weeks. Ok to shower. No weight lifting greater than 20 lbs for 4 weeks. Leave the Steri strips in place. They will fall off on their own in 7-10 days. Take tylenol or ibuprofen for as needed for mild pain every 4-6 hours. Percocet prescribed for mod to severe pain. This is a narcotic and can cause drowsiness, nausea and vomiting and constipation. Take Colace 100mg BID (over the counter) if constipated. Appendectomy: What to Expect at Home  Your Recovery    Your doctor removed your appendix either by making many small cuts, called incisions, in your belly (laparoscopic surgery) or through open surgery. In open surgery, the doctor makes one large incision. The incisions leave scars that usually fade over time. After your surgery, it is normal to feel weak and tired for several days after you return home. Your belly may be swollen and may be painful. If you had laparoscopic surgery, you may have pain in your shoulder for about 24 hours. You may also feel sick to your stomach and have diarrhea, constipation, gas, or a headache. This usually goes away in a few days. Your recovery time depends on the type of surgery you had. If you had laparoscopic surgery, you will probably be able to return to work or a normal routine 1 to 3 weeks after surgery. If you had an open surgery, it may take 2 to 4 weeks. If your appendix ruptured, you may have a drain in your incision. Your body will work fine without an appendix. You will not have to make any changes in your diet or lifestyle. This care sheet gives you a general idea about how long it will take for you to recover. But each person recovers at a different pace. Follow the steps below to get better as quickly as possible. How can you care for yourself at home? Activity    · Rest when you feel tired. Getting enough sleep will help you recover.     · Try to walk each day. Start by walking a little more than you did the day before.  Bit by bit, increase the amount you walk. Walking boosts blood flow and helps prevent pneumonia and constipation.     · For about 2 weeks, avoid lifting anything that would make you strain. This may include a child, heavy grocery bags and milk containers, a heavy briefcase or backpack, cat litter or dog food bags, or a vacuum .     · Avoid strenuous activities, such as bicycle riding, jogging, weight lifting, or aerobic exercise, until your doctor says it is okay.     · You may be able to take showers (unless you have a drain near your incision) 24 to 48 hours after surgery. Pat the incision dry. Do not take a bath for the first 2 weeks, or until your doctor tells you it is okay. If you have a drain near your incision, follow your doctor's instructions.     · You may drive when you are no longer taking pain medicine and can quickly move your foot from the gas pedal to the brake. You must also be able to sit comfortably for a long period of time, even if you do not plan on going far. You might get caught in traffic.     · You will probably be able to go back to work in 1 to 3 weeks. If you had an open surgery, it may take 3 to 4 weeks.     · Your doctor will tell you when you can have sex again. Diet    · You can eat your normal diet. If your stomach is upset, try bland, low-fat foods like plain rice, broiled chicken, toast, and yogurt.     · Drink plenty of fluids (unless your doctor tells you not to).     · You may notice that your bowel movements are not regular right after your surgery. This is common. Try to avoid constipation and straining with bowel movements. You may want to take a fiber supplement every day. If you have not had a bowel movement after a couple of days, ask your doctor about taking a mild laxative. Medicines    · Your doctor will tell you if and when you can restart your medicines.  He or she will also give you instructions about taking any new medicines.     · If you take aspirin or some other blood thinner, ask your doctor if and when to start taking it again. Make sure that you understand exactly what your doctor wants you to do.     · If your appendix ruptured, you will need to take antibiotics. Take them as directed. Do not stop taking them just because you feel better. You need to take the full course of antibiotics.     · Be safe with medicines. Take pain medicines exactly as directed. ? If the doctor gave you a prescription medicine for pain, take it as prescribed. ? If you are not taking a prescription pain medicine, take an over-the-counter medicine such as acetaminophen (Tylenol), ibuprofen (Advil, Motrin), or naproxen (Aleve). Read and follow all instructions on the label. ? Do not take two or more pain medicines at the same time unless the doctor told you to. Many pain medicines have acetaminophen, which is Tylenol. Too much Tylenol can be harmful.     · If you think your pain medicine is making you sick to your stomach:  ? Take your medicine after meals (unless your doctor has told you not to). ? Ask your doctor for a different pain medicine. Incision care    · If you had an open surgery, you may have staples in your incision. The doctor will take these out in 7 to 10 days.     · If you have strips of tape on the incision, leave the tape on for a week or until it falls off.     · You may wash the area with warm, soapy water 24 to 48 hours after your surgery, unless your doctor tells you not to. Pat the area dry.     · Keep the area clean and dry. You may cover it with a gauze bandage if it weeps or rubs against clothing. Change the bandage every day.     · If your appendix ruptured, you may have an incision with packing in it. Change the packing as often as your doctor tells you to. ? Packing changes may hurt at first. Taking pain medicine about half an hour before you change the dressing can help.   ? If your dressing sticks to your wound, try soaking it with warm water for about 10 minutes before you remove it. You can do this in the shower or by placing a wet washcloth over the dressing. ? Remove the old packing and flush the incision with water. Gently pat the top area dry. ? The size of the incision determines how much gauze you need to put inside. Fold the gauze over once, but do not wad it up so that it hurts. Put it in the wound carefully. You want to keep the sides of the wound from touching. A cotton swab may help you push the gauze in as needed. ? Put a gauze pad over the wound, and tape it down. ? You may notice greenish gray fluid seeping from your wound as you start to heal. This is normal. It is a sign that your wound is healing. Follow-up care is a key part of your treatment and safety. Be sure to make and go to all appointments, and call your doctor if you are having problems. It's also a good idea to know your test results and keep a list of the medicines you take. When should you call for help? Call 911 anytime you think you may need emergency care. For example, call if:    · You passed out (lost consciousness).     · You are short of breath. Craven Gustine your doctor now or seek immediate medical care if:    · You are sick to your stomach and cannot drink fluids.     · You cannot pass stools or gas.     · You have pain that does not get better when you take your pain medicine.     · You have signs of infection, such as:  ? Increased pain, swelling, warmth, or redness. ? Red streaks leading from the wound. ? Pus draining from the wound. ? A fever.     · You have loose stitches, or your incision comes open.     · Bright red blood has soaked through the bandage over your incision.     · You have signs of a blood clot in your leg (called a deep vein thrombosis), such as:  ? Pain in your calf, back of knee, thigh, or groin. ?  Redness and swelling in your leg or groin.    Watch closely for any changes in your health, and be sure to contact your doctor if you have any problems. Where can you learn more? Go to http://yessica-padma.info/  Enter X801 in the search box to learn more about \"Appendectomy: What to Expect at Home. \"  Current as of: August 11, 2019Content Version: 12.4  © 9370-8196 [employer]. Care instructions adapted under license by [employer] (which disclaims liability or warranty for this information). If you have questions about a medical condition or this instruction, always ask your healthcare professional. [address] any warranty or liability for your use of this information.

## 2020-04-06 NOTE — PERIOP NOTES
Complaint of difficulty catching breath. Reassured patient. Notified Dr. Micky Peoples. O2 Saturations 95 to 97% on 3L NC. Dr. Micky Peoples to bedside. New orders placed.

## 2020-04-06 NOTE — ANESTHESIA PREPROCEDURE EVALUATION
Relevant Problems   No relevant active problems       Anesthetic History               Review of Systems / Medical History  Patient summary reviewed, nursing notes reviewed and pertinent labs reviewed    Pulmonary                   Neuro/Psych              Cardiovascular    Hypertension: well controlled              Exercise tolerance: >4 METS     GI/Hepatic/Renal               Comments: Appendicitis  Endo/Other      Hypothyroidism: well controlled       Other Findings            Physical Exam    Airway  Mallampati: II  TM Distance: > 6 cm  Neck ROM: decreased range of motion   Mouth opening: Normal     Cardiovascular  Regular rate and rhythm,  S1 and S2 normal,  no murmur, click, rub, or gallop             Dental         Pulmonary  Breath sounds clear to auscultation               Abdominal  GI exam deferred       Other Findings            Anesthetic Plan    ASA: 3, emergent  Anesthesia type: general            Anesthetic plan and risks discussed with: Patient

## 2020-04-06 NOTE — OP NOTES
Operative Report    Name: Naomy Alexandra      MRN: [id number]       : 1945       Age: 76 y.o. Sex: male    Date of Surgery: 2020     Preoperative Diagnosis: Appendicitis     Postoperative Diagnosis: Perforated Appendicitis     Name of procedure: Procedure(s):  [address], DRAINAGE OF ABSCESS     Surgeon: Pauline Landrum MD     Anesthesia: General     Complications: None    Estimated Blood Loss: Minimal           Specimens:   ID Type Source Tests Collected by Time Destination   1 : appendix Preservative Appendix  Jennifer Caballero MD 2020 1340 Pathology       Implants:  * No implants in log *    Findings:  Acute inflamed appendix with contained perforation and abscess. Abscess drained. Statement of Medical Necessity: Naomy Alexandra is a 76 y.o. male who presented to the ER complaining of RLQ pain and diarrhea. CT scan showed perforated appendicitis. A laparoscopic vs open appendectomy was recommended. We discussed risks, benefits and alternatives of surgery. Patient understood and agreed to proceed with appendectomy . We discussed risks of appendectomy including but not limited to pain, infection, bleeding, scar, conversion from laparoscopic to open, injury to organs, enterotomy, cautery injury, hernia, need for additional procedures. Procedure Details   After informed consent was taken, patient was taken to the operating room and placed in supine position. Anesthesia was induced and patient was intubated. Patient received preoperative antibiotics. Patient voided on call to OR. Abdomen was prepped and draped in standard sterile fashion. A timeout was performed with all team member present. A 1 cm vertical incision was made in the infraumbilical area. Dissection was carried with electrocautery to the fascia. The fascia was elevated. A Veress needle was inserted.  Saline drop test was normal. The abdomen was insufflated with carbon dioxide to a pressure of 15 mmHg. The patient tolerated the insufflation well. A 12 mm trocar was inserted using and Optiview technique. A general survey was performed and no injury was observed from trocar placement. Two 5 mm trocars were placed in the left lower quadrant and suprapubic area under direct visualization. The patient was placed in Trendelenburg position with his right side up. The appendix was retrocecal very adhered to the retroperitoneum. Carefull dissection was performed and the right colon was medialized. The appendix was enlarged and there was abscess near mid appendix. The abscess was thoroughly drained. The appendix was elevated and a [place] was used to create a window at the base of the appendix. The base of the appendix was then divided using an 45 mm Ethicon blue loaded stapler. The appendix was then carefully dissected of the retrocecal wall. Ligasure was used were necessary. The appendix was placed in an EndoCatch bag. Hemostasis was verified. The appendix was removed through the umbilical incision. The 12 mm port fascia was closed with 0-Vicryl sutures in interrupted fashion using an endoclosure device. The abdomen was desufflated. The 5 mm ports were removed under direct visualization. The skin of all cannula insertion sites was closed with 4-0 Monocryl subcuticular sutures. Mastisol and Steri strips were applied to the skin incisions. All counts were reported as correct. The patient was awakened from anesthesia, transferred to a stretcher, and transported to the PACU in good condition.       Signed by: Miriam Dickinson MD  [facility] - Bariatric & Minimally Invasive Surgery  4/6/2020 3:16 PM

## 2021-02-17 ENCOUNTER — HOSPITAL ENCOUNTER (EMERGENCY)
Age: 76
Discharge: HOME | End: 2021-02-17
Attending: EMERGENCY MEDICINE
Payer: MEDICARE

## 2021-02-17 ENCOUNTER — HOSPITAL ENCOUNTER (EMERGENCY)
Age: 76
Discharge: STILL ADMITTED OR ADMINISTRATIVE | End: 2021-02-17

## 2021-02-17 ENCOUNTER — APPOINTMENT (OUTPATIENT)
Dept: GENERAL RADIOLOGY | Age: 76
End: 2021-02-17
Attending: EMERGENCY MEDICINE
Payer: MEDICARE

## 2021-02-17 VITALS
TEMPERATURE: 99.7 F | SYSTOLIC BLOOD PRESSURE: 124 MMHG | RESPIRATION RATE: 13 BRPM | HEIGHT: 70 IN | BODY MASS INDEX: 27.2 KG/M2 | HEART RATE: 80 BPM | WEIGHT: 190 LBS | OXYGEN SATURATION: 97 % | DIASTOLIC BLOOD PRESSURE: 72 MMHG

## 2021-02-17 DIAGNOSIS — U07.1 COVID-19 VIRUS INFECTION: Primary | ICD-10-CM

## 2021-02-17 DIAGNOSIS — J20.8 ACUTE BRONCHITIS DUE TO OTHER SPECIFIED ORGANISMS: ICD-10-CM

## 2021-02-17 LAB
ALBUMIN SERPL-MCNC: 4.1 G/DL (ref 3.2–4.6)
ALBUMIN/GLOB SERPL: 1 {RATIO} (ref 1.2–3.5)
ALP SERPL-CCNC: 64 U/L (ref 50–136)
ALT SERPL-CCNC: 53 U/L (ref 12–65)
ANION GAP SERPL CALC-SCNC: 9 MMOL/L (ref 7–16)
APPEARANCE UR: ABNORMAL
AST SERPL-CCNC: 94 U/L (ref 15–37)
BACTERIA URNS QL MICRO: ABNORMAL /HPF
BASOPHILS # BLD: 0 K/UL (ref 0–0.2)
BASOPHILS NFR BLD: 0 % (ref 0–2)
BILIRUB SERPL-MCNC: 1.4 MG/DL (ref 0.2–1.1)
BILIRUB UR QL: ABNORMAL
BUN SERPL-MCNC: 15 MG/DL (ref 8–23)
CALCIUM SERPL-MCNC: 9.3 MG/DL (ref 8.3–10.4)
CASTS URNS QL MICRO: ABNORMAL /LPF
CHLORIDE SERPL-SCNC: 98 MMOL/L (ref 98–107)
CO2 SERPL-SCNC: 28 MMOL/L (ref 21–32)
COLOR UR: ABNORMAL
COVID-19 RAPID TEST, COVR: DETECTED
CREAT SERPL-MCNC: 1.16 MG/DL (ref 0.8–1.5)
DIFFERENTIAL METHOD BLD: ABNORMAL
EOSINOPHIL # BLD: 0.1 K/UL (ref 0–0.8)
EOSINOPHIL NFR BLD: 2 % (ref 0.5–7.8)
EPI CELLS #/AREA URNS HPF: ABNORMAL /HPF
ERYTHROCYTE [DISTWIDTH] IN BLOOD BY AUTOMATED COUNT: 12.2 % (ref 11.9–14.6)
FLUAV AG NPH QL IA: NEGATIVE
FLUBV AG NPH QL IA: NEGATIVE
GLOBULIN SER CALC-MCNC: 4.1 G/DL (ref 2.3–3.5)
GLUCOSE SERPL-MCNC: 106 MG/DL (ref 65–100)
GLUCOSE UR STRIP.AUTO-MCNC: NEGATIVE MG/DL
HCT VFR BLD AUTO: 51.9 % (ref 41.1–50.3)
HGB BLD-MCNC: 17.7 G/DL (ref 13.6–17.2)
HGB UR QL STRIP: NEGATIVE
IMM GRANULOCYTES # BLD AUTO: 0 K/UL (ref 0–0.5)
IMM GRANULOCYTES NFR BLD AUTO: 1 % (ref 0–5)
KETONES UR QL STRIP.AUTO: 80 MG/DL
LACTATE SERPL-SCNC: 1.7 MMOL/L (ref 0.4–2)
LEUKOCYTE ESTERASE UR QL STRIP.AUTO: NEGATIVE
LYMPHOCYTES # BLD: 2.5 K/UL (ref 0.5–4.6)
LYMPHOCYTES NFR BLD: 37 % (ref 13–44)
MCH RBC QN AUTO: 32.2 PG (ref 26.1–32.9)
MCHC RBC AUTO-ENTMCNC: 34.1 G/DL (ref 31.4–35)
MCV RBC AUTO: 94.5 FL (ref 79.6–97.8)
MONOCYTES # BLD: 0.5 K/UL (ref 0.1–1.3)
MONOCYTES NFR BLD: 8 % (ref 4–12)
MUCOUS THREADS URNS QL MICRO: ABNORMAL /LPF
NEUTS SEG # BLD: 3.4 K/UL (ref 1.7–8.2)
NEUTS SEG NFR BLD: 52 % (ref 43–78)
NITRITE UR QL STRIP.AUTO: NEGATIVE
NRBC # BLD: 0 K/UL (ref 0–0.2)
PH UR STRIP: 5.5 [PH] (ref 5–9)
PLATELET # BLD AUTO: 141 K/UL (ref 150–450)
PMV BLD AUTO: 10 FL (ref 9.4–12.3)
POTASSIUM SERPL-SCNC: 4.2 MMOL/L (ref 3.5–5.1)
PROCALCITONIN SERPL-MCNC: 0.05 NG/ML
PROT SERPL-MCNC: 8.2 G/DL (ref 6.3–8.2)
PROT UR STRIP-MCNC: 30 MG/DL
RBC # BLD AUTO: 5.49 M/UL (ref 4.23–5.6)
RBC #/AREA URNS HPF: ABNORMAL /HPF
SARS-COV-2, COV2: NORMAL
SODIUM SERPL-SCNC: 135 MMOL/L (ref 136–145)
SOURCE, COVRS: ABNORMAL
SP GR UR REFRACTOMETRY: 1.03 (ref 1–1.02)
SPECIMEN SOURCE: NORMAL
UROBILINOGEN UR QL STRIP.AUTO: 0.2 EU/DL (ref 0.2–1)
WBC # BLD AUTO: 6.6 K/UL (ref 4.3–11.1)
WBC URNS QL MICRO: ABNORMAL /HPF

## 2021-02-17 PROCEDURE — 74011250636 HC RX REV CODE- 250/636: Performed by: EMERGENCY MEDICINE

## 2021-02-17 PROCEDURE — 96361 HYDRATE IV INFUSION ADD-ON: CPT

## 2021-02-17 PROCEDURE — 87635 SARS-COV-2 COVID-19 AMP PRB: CPT

## 2021-02-17 PROCEDURE — 74011000258 HC RX REV CODE- 258: Performed by: EMERGENCY MEDICINE

## 2021-02-17 PROCEDURE — 87804 INFLUENZA ASSAY W/OPTIC: CPT

## 2021-02-17 PROCEDURE — 87086 URINE CULTURE/COLONY COUNT: CPT

## 2021-02-17 PROCEDURE — 71045 X-RAY EXAM CHEST 1 VIEW: CPT

## 2021-02-17 PROCEDURE — 94640 AIRWAY INHALATION TREATMENT: CPT

## 2021-02-17 PROCEDURE — 93005 ELECTROCARDIOGRAM TRACING: CPT | Performed by: EMERGENCY MEDICINE

## 2021-02-17 PROCEDURE — 99284 EMERGENCY DEPT VISIT MOD MDM: CPT

## 2021-02-17 PROCEDURE — 96375 TX/PRO/DX INJ NEW DRUG ADDON: CPT

## 2021-02-17 PROCEDURE — 83605 ASSAY OF LACTIC ACID: CPT

## 2021-02-17 PROCEDURE — 74011000250 HC RX REV CODE- 250: Performed by: EMERGENCY MEDICINE

## 2021-02-17 PROCEDURE — 81003 URINALYSIS AUTO W/O SCOPE: CPT

## 2021-02-17 PROCEDURE — 84145 PROCALCITONIN (PCT): CPT

## 2021-02-17 PROCEDURE — 96374 THER/PROPH/DIAG INJ IV PUSH: CPT

## 2021-02-17 PROCEDURE — 87040 BLOOD CULTURE FOR BACTERIA: CPT

## 2021-02-17 PROCEDURE — 94664 DEMO&/EVAL PT USE INHALER: CPT

## 2021-02-17 PROCEDURE — 85025 COMPLETE CBC W/AUTO DIFF WBC: CPT

## 2021-02-17 PROCEDURE — 80053 COMPREHEN METABOLIC PANEL: CPT

## 2021-02-17 RX ORDER — ALBUTEROL SULFATE 90 UG/1
2 AEROSOL, METERED RESPIRATORY (INHALATION)
Qty: 1 INHALER | Refills: 0 | Status: SHIPPED | OUTPATIENT
Start: 2021-02-17

## 2021-02-17 RX ORDER — DIPHENOXYLATE HYDROCHLORIDE AND ATROPINE SULFATE 2.5; .025 MG/1; MG/1
1 TABLET ORAL
Qty: 20 TAB | Refills: 0 | Status: SHIPPED | OUTPATIENT
Start: 2021-02-17

## 2021-02-17 RX ORDER — AZITHROMYCIN 250 MG/1
TABLET, FILM COATED ORAL
Qty: 6 TAB | Refills: 0 | Status: SHIPPED | OUTPATIENT
Start: 2021-02-17

## 2021-02-17 RX ORDER — ALBUTEROL SULFATE 2.5 MG/.5ML
5 SOLUTION RESPIRATORY (INHALATION)
Status: COMPLETED | OUTPATIENT
Start: 2021-02-17 | End: 2021-02-17

## 2021-02-17 RX ORDER — KETOROLAC TROMETHAMINE 30 MG/ML
30 INJECTION, SOLUTION INTRAMUSCULAR; INTRAVENOUS ONCE
Status: COMPLETED | OUTPATIENT
Start: 2021-02-17 | End: 2021-02-17

## 2021-02-17 RX ORDER — DEXTROSE MONOHYDRATE AND SODIUM CHLORIDE 5; .45 G/100ML; G/100ML
1000 INJECTION, SOLUTION INTRAVENOUS ONCE
Status: COMPLETED | OUTPATIENT
Start: 2021-02-17 | End: 2021-02-17

## 2021-02-17 RX ORDER — PREDNISONE 20 MG/1
60 TABLET ORAL DAILY
Qty: 12 TAB | Refills: 0 | Status: SHIPPED | OUTPATIENT
Start: 2021-02-17 | End: 2021-02-21

## 2021-02-17 RX ORDER — ONDANSETRON 2 MG/ML
4 INJECTION INTRAMUSCULAR; INTRAVENOUS ONCE
Status: COMPLETED | OUTPATIENT
Start: 2021-02-17 | End: 2021-02-17

## 2021-02-17 RX ORDER — ONDANSETRON 4 MG/1
4 TABLET, ORALLY DISINTEGRATING ORAL
Qty: 12 TAB | Refills: 0 | Status: SHIPPED | OUTPATIENT
Start: 2021-02-17

## 2021-02-17 RX ORDER — IPRATROPIUM BROMIDE AND ALBUTEROL SULFATE 2.5; .5 MG/3ML; MG/3ML
3 SOLUTION RESPIRATORY (INHALATION)
Status: DISCONTINUED | OUTPATIENT
Start: 2021-02-17 | End: 2021-02-18 | Stop reason: HOSPADM

## 2021-02-17 RX ADMIN — ONDANSETRON 4 MG: 2 INJECTION INTRAMUSCULAR; INTRAVENOUS at 19:35

## 2021-02-17 RX ADMIN — DEXTROSE MONOHYDRATE AND SODIUM CHLORIDE 1000 ML/HR: 5; .45 INJECTION, SOLUTION INTRAVENOUS at 19:34

## 2021-02-17 RX ADMIN — METHYLPREDNISOLONE SODIUM SUCCINATE 125 MG: 125 INJECTION, POWDER, FOR SOLUTION INTRAMUSCULAR; INTRAVENOUS at 19:36

## 2021-02-17 RX ADMIN — SODIUM CHLORIDE, SODIUM LACTATE, POTASSIUM CHLORIDE, AND CALCIUM CHLORIDE 1000 ML: 600; 310; 30; 20 INJECTION, SOLUTION INTRAVENOUS at 18:42

## 2021-02-17 RX ADMIN — KETOROLAC TROMETHAMINE 30 MG: 30 INJECTION, SOLUTION INTRAMUSCULAR at 19:36

## 2021-02-17 RX ADMIN — ALBUTEROL SULFATE 5 MG: 2.5 SOLUTION RESPIRATORY (INHALATION) at 19:38

## 2021-02-17 NOTE — ED PROVIDER NOTES
49-year-old male presenting for flulike symptoms. Reports that about 10 days ago he developed some muscle aches congestion that is persisted and worsened over those days. He is now complaining of headache, congestion, fatigue, loss of taste and smell, loss of appetite, cough shortness of breath and weakness. He has diffuse muscle aches no energy. He was exposed to COVID-19 through his daughter or some other relative. His wife has similar but less severe symptoms. He developed some diarrhea earlier today as well. The history is provided by the patient. Positive For Covid-19  Max temp prior to arrival:  102  Temp source:  Oral  Severity:  Moderate  Onset quality:  Gradual  Duration:  10 days  Timing:  Constant  Progression:  Waxing and waning  Chronicity:  New  Relieved by:  Acetaminophen, cold compresses and ibuprofen  Worsened by:  Exertion  Ineffective treatments:  None tried  Associated symptoms: chills, congestion, cough and diarrhea    Associated symptoms: no chest pain, no confusion, no dysuria, no ear pain, no headaches, no myalgias, no nausea, no rash, no rhinorrhea, no somnolence, no sore throat and no vomiting    Risk factors: sick contacts    Risk factors: no contaminated food, no contaminated water, no hx of cancer, no immunosuppression, no occupational exposure, no recent sickness and no recent travel         Past Medical History:   Diagnosis Date    Hypercholesterolemia     Hypertension     Hypothyroid        Past Surgical History:   Procedure Laterality Date    CHEST SURGERY PROCEDURE UNLISTED      lung mass on right benign    HX UROLOGICAL           No family history on file.     Social History     Socioeconomic History    Marital status:      Spouse name: Not on file    Number of children: Not on file    Years of education: Not on file    Highest education level: Not on file   Occupational History    Not on file   Social Needs    Financial resource strain: Not on file   Zannie Cowden Food insecurity     Worry: Not on file     Inability: Not on file    Transportation needs     Medical: Not on file     Non-medical: Not on file   Tobacco Use    Smoking status: Former Smoker    Smokeless tobacco: Former User   Substance and Sexual Activity    Alcohol use: Never     Frequency: Never    Drug use: Never    Sexual activity: Not on file   Lifestyle    Physical activity     Days per week: Not on file     Minutes per session: Not on file    Stress: Not on file   Relationships    Social connections     Talks on phone: Not on file     Gets together: Not on file     Attends Anabaptism service: Not on file     Active member of club or organization: Not on file     Attends meetings of clubs or organizations: Not on file     Relationship status: Not on file    Intimate partner violence     Fear of current or ex partner: Not on file     Emotionally abused: Not on file     Physically abused: Not on file     Forced sexual activity: Not on file   Other Topics Concern    Not on file   Social History Narrative    Not on file         ALLERGIES: Patient has no known allergies. Review of Systems   Constitutional: Positive for chills. HENT: Positive for congestion. Negative for ear pain, rhinorrhea and sore throat. Respiratory: Positive for cough. Cardiovascular: Negative for chest pain. Gastrointestinal: Positive for diarrhea. Negative for nausea and vomiting. Genitourinary: Negative for dysuria. Musculoskeletal: Negative for myalgias. Skin: Negative for rash. Neurological: Negative for headaches. Psychiatric/Behavioral: Negative for confusion. All other systems reviewed and are negative. Vitals:    02/17/21 1819 02/17/21 1821   BP:  (!) 87/65   Pulse: 86    Resp: 22    Temp: 99.7 °F (37.6 °C)    SpO2: 93%    Weight: 86.2 kg (190 lb)    Height: 5' 10\" (1.778 m)             Physical Exam  Vitals signs and nursing note reviewed.    Constitutional:       Appearance: He is well-developed. He is ill-appearing. HENT:      Head: Normocephalic and atraumatic. Eyes:      Conjunctiva/sclera: Conjunctivae normal.      Pupils: Pupils are equal, round, and reactive to light. Neck:      Musculoskeletal: Normal range of motion and neck supple. Cardiovascular:      Rate and Rhythm: Normal rate and regular rhythm. Heart sounds: Normal heart sounds. Pulmonary:      Effort: Pulmonary effort is normal.      Breath sounds: Wheezing and rales present. Abdominal:      General: Bowel sounds are normal.      Palpations: Abdomen is soft. Musculoskeletal: Normal range of motion. General: No deformity. Skin:     General: Skin is warm and dry. Neurological:      Mental Status: He is alert and oriented to person, place, and time. Cranial Nerves: No cranial nerve deficit. Psychiatric:         Behavior: Behavior normal.          MDM  Number of Diagnoses or Management Options  Acute bronchitis due to other specified organisms  COVID-19 virus infection  Diagnosis management comments: Pleasant 51-year-old male presenting for cough, wheezing muscle aches and fever consistent with likely COVID-19 infection.        Amount and/or Complexity of Data Reviewed  Clinical lab tests: ordered and reviewed (Results for orders placed or performed during the hospital encounter of 02/17/21  -INFLUENZA A & B AG (RAPID TEST)       Result                      Value             Ref Range           Influenza A Ag              Negative          NEG                 Influenza B Ag              Negative          NEG                 Source                                                        Nasopharyngeal  -COVID-19 RAPID TEST       Result                      Value             Ref Range           Specimen source                                               Nasopharyngeal       COVID-19 rapid test         Detected (AA)     NOTD           -LACTIC ACID       Result                      Value Ref Range           Lactic acid                 1.7               0.4 - 2.0 MM*  -CBC WITH AUTOMATED DIFF       Result                      Value             Ref Range           WBC                         6.6               4.3 - 11.1 K*       RBC                         5.49              4.23 - 5.6 M*       HGB                         17.7 (H)          13.6 - 17.2 *       HCT                         51.9 (H)          41.1 - 50.3 %       MCV                         94.5              79.6 - 97.8 *       MCH                         32.2              26.1 - 32.9 *       MCHC                        34.1              31.4 - 35.0 *       RDW                         12.2              11.9 - 14.6 %       PLATELET                    141 (L)           150 - 450 K/*       MPV                         10.0              9.4 - 12.3 FL       ABSOLUTE NRBC               0.00              0.0 - 0.2 K/*       DF                          AUTOMATED                             NEUTROPHILS                 52                43 - 78 %           LYMPHOCYTES                 37                13 - 44 %           MONOCYTES                   8                 4.0 - 12.0 %        EOSINOPHILS                 2                 0.5 - 7.8 %         BASOPHILS                   0                 0.0 - 2.0 %         IMMATURE GRANULOCYTES       1                 0.0 - 5.0 %         ABS. NEUTROPHILS            3.4               1.7 - 8.2 K/*       ABS. LYMPHOCYTES            2.5               0.5 - 4.6 K/*       ABS. MONOCYTES              0.5               0.1 - 1.3 K/*       ABS. EOSINOPHILS            0.1               0.0 - 0.8 K/*       ABS. BASOPHILS              0.0               0.0 - 0.2 K/*       ABS. IMM.  GRANS.            0.0               0.0 - 0.5 K/*  -METABOLIC PANEL, COMPREHENSIVE       Result                      Value             Ref Range           Sodium                      135 (L)           136 - 145 mm*       Potassium 4.2               3.5 - 5.1 mm*       Chloride                    98                98 - 107 mmo*       CO2                         28                21 - 32 mmol*       Anion gap                   9                 7 - 16 mmol/L       Glucose                     106 (H)           65 - 100 mg/*       BUN                         15                8 - 23 MG/DL        Creatinine                  1.16              0.8 - 1.5 MG*       GFR est AA                  >60               >60 ml/min/1*       GFR est non-AA              >60               >60 ml/min/1*       Calcium                     9.3               8.3 - 10.4 M*       Bilirubin, total            1.4 (H)           0.2 - 1.1 MG*       ALT (SGPT)                  53                12 - 65 U/L         AST (SGOT)                  94 (H)            15 - 37 U/L         Alk.  phosphatase            64                50 - 136 U/L        Protein, total              8.2               6.3 - 8.2 g/*       Albumin                     4.1               3.2 - 4.6 g/*       Globulin                    4.1 (H)           2.3 - 3.5 g/*       A-G Ratio                   1.0 (L)           1.2 - 3.5      -URINALYSIS W/ RFLX MICROSCOPIC       Result                      Value             Ref Range           Color                       VIRY                                 Appearance                  CLOUDY                                Specific gravity            1.027 (H)         1.001 - 1.02*       pH (UA)                     5.5               5.0 - 9.0           Protein                     30 (A)            NEG mg/dL           Glucose                     Negative          NEG mg/dL           Ketone                      80 (A)            NEG mg/dL           Bilirubin                   SMALL (A)         NEG                 Blood                       Negative          NEG                 Urobilinogen                0.2               0.2 - 1.0 EU*       Nitrites Negative          NEG                 Leukocyte Esterase          Negative          NEG                 WBC                         3-5               0 /hpf              RBC                         5-10              0 /hpf              Epithelial cells            3-5               0 /hpf              Bacteria                    TRACE             0 /hpf              Casts                       3-5               0 /lpf              Mucus                       TRACE             0 /lpf         -SARS-COV-2       Result                      Value             Ref Range           SARS-CoV-2                                                    Please find results under separate order  )  Tests in the radiology section of CPT®: ordered and reviewed (Xr Chest Port    Result Date: 2/17/2021  AP PORTABLE CHEST X-RAY 2/17/2021 6:50 PM HISTORY: meets SIRS criteria  Pt states he has had decreased appetite, weakness, insomnia, loss of taste and smell, and  body aches X 10 days. -meets SIRS criteria COMPARISON: May 24, 2019 FINDINGS:  Surgical staple lines are present in the right lung base. EKG leads are present. There is no lobar consolidation, pleural effusions or pulmonary edema. Stable appearance of the chest. No consolidation.    )  Tests in the medicine section of CPT®: ordered and reviewed  Independent visualization of images, tracings, or specimens: yes    Risk of Complications, Morbidity, and/or Mortality  Presenting problems: high  Diagnostic procedures: high  Management options: moderate  General comments: Patient has remained hemodynamically stable and he feels somewhat better after treatment. No vomiting or diarrhea here in the department he has no oxygen requirement. Breathing treatment bumped his O2 sats from 93% to 96-97% on room air. Chest x-ray is clear. Blood work is unremarkable. This point the patient falls within low to medium risk category but does not seem to have any hospitalize will need.   We will provide the patient with the finger pulse oximeter with instructions to monitor daily. If dropping consistently below 90 should return to the ER. I personally reviewed the patient's vital signs, laboratory tests, and/or radiological findings. I discussed these findings with the patient and their significance. I answered all questions and gave the patient clear return precautions. The patient was discharged from the emergency department in stable condition        Patient Progress  Patient progress: improved    ED Course as of Feb 17 2055 Wed Feb 17, 2021 2003 Patient is positive for COVID-19.     [JS]      ED Course User Index  [JS] Leslie Ta MD       EKG    Date/Time: 2/17/2021 6:45 PM  Performed by: Leslie Ta MD  Authorized by: Leslie Ta MD     ECG reviewed by ED Physician in the absence of a cardiologist: yes    Previous ECG:     Previous ECG:  Unavailable  Interpretation:     Interpretation: normal    Rate:     ECG rate:  85    ECG rate assessment: normal    Rhythm:     Rhythm: sinus rhythm    Ectopy:     Ectopy: none    QRS:     QRS axis:  Normal  Conduction:     Conduction: normal    ST segments:     ST segments:  Normal  T waves:     T waves: normal

## 2021-02-17 NOTE — ED NOTES
Pt states he has had decreased appetite, weakness, insomnia, loss of taste and smell, and  body aches X 10 days.

## 2021-02-18 LAB
ATRIAL RATE: 85 BPM
CALCULATED P AXIS, ECG09: 61 DEGREES
CALCULATED R AXIS, ECG10: 65 DEGREES
CALCULATED T AXIS, ECG11: 55 DEGREES
DIAGNOSIS, 93000: NORMAL
P-R INTERVAL, ECG05: 174 MS
Q-T INTERVAL, ECG07: 390 MS
QRS DURATION, ECG06: 74 MS
QTC CALCULATION (BEZET), ECG08: 464 MS
VENTRICULAR RATE, ECG03: 85 BPM

## 2021-02-18 NOTE — ED NOTES
I have reviewed discharge instructions with the patient. The patient verbalized understanding. Patient left ED via Discharge Method: ambulatory to Home with daughter. Opportunity for questions and clarification provided. Patient given 5 scripts. To continue your aftercare when you leave the hospital, you may receive an automated call from our care team to check in on how you are doing. This is a free service and part of our promise to provide the best care and service to meet your aftercare needs.  If you have questions, or wish to unsubscribe from this service please call [phone]. Thank you for Choosing our [address] [place] [address] Emergency Department.

## 2021-02-18 NOTE — DISCHARGE INSTRUCTIONS
Use the medications as prescribed. Hopefully it will provide some relief. Drink plenty of fluids and continue to rest and isolate as best you can. Use the pulse ox several times per day. If it were to persistently drop below 90% return to the ER for further evaluation.

## 2021-02-20 LAB
BACTERIA SPEC CULT: NORMAL
SERVICE CMNT-IMP: NORMAL

## 2024-07-01 ENCOUNTER — HOSPITAL ENCOUNTER (EMERGENCY)
Age: 79
Discharge: HOME | End: 2024-07-02
Payer: MEDICARE

## 2024-07-01 ENCOUNTER — APPOINTMENT (OUTPATIENT)
Dept: CT IMAGING | Age: 79
End: 2024-07-01
Payer: MEDICARE

## 2024-07-01 ENCOUNTER — APPOINTMENT (OUTPATIENT)
Dept: GENERAL RADIOLOGY | Age: 79
End: 2024-07-01
Payer: MEDICARE

## 2024-07-01 DIAGNOSIS — S82.891A CLOSED FRACTURE OF RIGHT ANKLE, INITIAL ENCOUNTER: Primary | ICD-10-CM

## 2024-07-01 PROCEDURE — 6370000000 HC RX 637 (ALT 250 FOR IP): Performed by: STUDENT IN AN ORGANIZED HEALTH CARE EDUCATION/TRAINING PROGRAM

## 2024-07-01 PROCEDURE — 73502 X-RAY EXAM HIP UNI 2-3 VIEWS: CPT

## 2024-07-01 PROCEDURE — 73590 X-RAY EXAM OF LOWER LEG: CPT

## 2024-07-01 PROCEDURE — 99284 EMERGENCY DEPT VISIT MOD MDM: CPT

## 2024-07-01 PROCEDURE — 72125 CT NECK SPINE W/O DYE: CPT

## 2024-07-01 PROCEDURE — 70450 CT HEAD/BRAIN W/O DYE: CPT

## 2024-07-01 PROCEDURE — 73610 X-RAY EXAM OF ANKLE: CPT

## 2024-07-01 PROCEDURE — 73562 X-RAY EXAM OF KNEE 3: CPT

## 2024-07-01 RX ORDER — ONDANSETRON 4 MG/1
4 TABLET, ORALLY DISINTEGRATING ORAL
Status: COMPLETED | OUTPATIENT
Start: 2024-07-01 | End: 2024-07-01

## 2024-07-01 RX ORDER — HYDROCODONE BITARTRATE AND ACETAMINOPHEN 7.5; 325 MG/1; MG/1
1 TABLET ORAL EVERY 6 HOURS PRN
Qty: 12 TABLET | Refills: 0 | Status: SHIPPED | OUTPATIENT
Start: 2024-07-01 | End: 2024-07-04

## 2024-07-01 RX ORDER — ONDANSETRON 4 MG/1
4 TABLET, FILM COATED ORAL 3 TIMES DAILY PRN
Qty: 15 TABLET | Refills: 0 | Status: SHIPPED | OUTPATIENT
Start: 2024-07-01

## 2024-07-01 RX ORDER — HYDROCODONE BITARTRATE AND ACETAMINOPHEN 5; 325 MG/1; MG/1
1 TABLET ORAL
Status: COMPLETED | OUTPATIENT
Start: 2024-07-01 | End: 2024-07-01

## 2024-07-01 RX ADMIN — ONDANSETRON 4 MG: 4 TABLET, ORALLY DISINTEGRATING ORAL at 22:56

## 2024-07-01 RX ADMIN — HYDROCODONE BITARTRATE AND ACETAMINOPHEN 1 TABLET: 5; 325 TABLET ORAL at 22:56

## 2024-07-01 ASSESSMENT — PAIN SCALES - GENERAL
PAINLEVEL_OUTOF10: 10
PAINLEVEL_OUTOF10: 10

## 2024-07-01 ASSESSMENT — PAIN - FUNCTIONAL ASSESSMENT: PAIN_FUNCTIONAL_ASSESSMENT: 0-10

## 2024-07-01 ASSESSMENT — LIFESTYLE VARIABLES
HOW MANY STANDARD DRINKS CONTAINING ALCOHOL DO YOU HAVE ON A TYPICAL DAY: PATIENT DOES NOT DRINK
HOW OFTEN DO YOU HAVE A DRINK CONTAINING ALCOHOL: NEVER

## 2024-07-02 ENCOUNTER — TELEPHONE (OUTPATIENT)
Dept: ORTHOPEDIC SURGERY | Age: 79
End: 2024-07-02

## 2024-07-02 VITALS
HEIGHT: 70 IN | BODY MASS INDEX: 28.35 KG/M2 | OXYGEN SATURATION: 99 % | TEMPERATURE: 98.1 F | WEIGHT: 198 LBS | RESPIRATION RATE: 18 BRPM | SYSTOLIC BLOOD PRESSURE: 151 MMHG | DIASTOLIC BLOOD PRESSURE: 79 MMHG | HEART RATE: 71 BPM

## 2024-07-02 ASSESSMENT — ENCOUNTER SYMPTOMS
COUGH: 0
TROUBLE SWALLOWING: 0
ABDOMINAL PAIN: 0
FACIAL SWELLING: 0
VOMITING: 0
SHORTNESS OF BREATH: 0
PHOTOPHOBIA: 0

## 2024-07-02 ASSESSMENT — VISUAL ACUITY: OU: 1

## 2024-07-02 NOTE — ED NOTES
Splint and crutch teaching done, boot on   Patient mobility status  with difficulty, uses a given crutches . Provider aware     I have reviewed discharge instructions with the patient.  The patient verbalized understanding.    Patient left ED via Discharge Method: wheelchair to Home with Spouse.    Opportunity for questions and clarification provided.     Patient given 2 scripts.

## 2024-07-02 NOTE — DISCHARGE INSTR - COC
{Prognosis:0016134758}    Recommended Labs or Other Treatments After Discharge: ***    Physician Certification: I certify the above information and transfer of Lili Hodges  is necessary for the continuing treatment of the diagnosis listed and that he requires {Admit to Appropriate Level of Care:00916} for {GREATER/LESS:864031166} 30 days.     Update Admission H&P: {CHP DME Changes in HandP:600579838}    PHYSICIAN SIGNATURE:  {Esignature:[id number]}
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
## 2024-07-09 ENCOUNTER — OFFICE VISIT (OUTPATIENT)
Dept: ORTHOPEDIC SURGERY | Age: 79
End: 2024-07-09
Payer: MEDICARE

## 2024-07-09 DIAGNOSIS — S82.891A CLOSED FRACTURE OF RIGHT ANKLE, INITIAL ENCOUNTER: Primary | ICD-10-CM

## 2024-07-09 DIAGNOSIS — S82.61XA CLOSED DISPLACED FRACTURE OF LATERAL MALLEOLUS OF RIGHT FIBULA, INITIAL ENCOUNTER: ICD-10-CM

## 2024-07-09 PROCEDURE — 99204 OFFICE O/P NEW MOD 45 MIN: CPT | Performed by: ORTHOPAEDIC SURGERY

## 2024-07-09 PROCEDURE — 1123F ACP DISCUSS/DSCN MKR DOCD: CPT | Performed by: ORTHOPAEDIC SURGERY

## 2024-07-09 PROCEDURE — G8428 CUR MEDS NOT DOCUMENT: HCPCS | Performed by: ORTHOPAEDIC SURGERY

## 2024-07-09 PROCEDURE — G8419 CALC BMI OUT NRM PARAM NOF/U: HCPCS | Performed by: ORTHOPAEDIC SURGERY

## 2024-07-09 PROCEDURE — 1036F TOBACCO NON-USER: CPT | Performed by: ORTHOPAEDIC SURGERY

## 2024-07-09 RX ORDER — HYDROCODONE BITARTRATE AND ACETAMINOPHEN 5; 325 MG/1; MG/1
1 TABLET ORAL EVERY 12 HOURS
Qty: 10 TABLET | Refills: 0 | Status: SHIPPED | OUTPATIENT
Start: 2024-07-09 | End: 2024-07-14

## 2024-07-09 NOTE — PROGRESS NOTES
the joint line and extends proximally  Radiographs correlate with exam of isolated lateral malleolar fracture  We discussed and offered surgical treatment  Lateral malleolar fracture has displacement but with intact appearing mortise syndesmosis and his age, plan conservative care    Advanced medical imaging: Right ankle/hindfoot MRI scan for CT scan: No indication today.  We discussed care and 3D boot with crutch protection  PT: No indication today but potential future     Medication - OTC meds prn: Prescribed:  Before taking any pill or using topical medication, I recommend the patient review all listed medication potential side effects via the Internet or pharmacy-provided handout to ensure patient safety and to understand and accept risks/complications of reported potential concerns.  I also recommend the patient review any concerns over medication sensitivities, reactions or medication compatibility with the pharmacist or if needed with primary care or internal medicine provider    Topical rub with CBD, Magnesium, Arnica, Frankincense, Myrrh  [Magne Hemp Max]  Dead sea bath salts   Hydrocodone 5 mg #10 tablets: 1 p.o. every 12 hours as needed for pain: No refills    Surgical discussion: Discussed potential: Right ankle ORIF: Elected conservative care  Follow up: 3 weeks: X-rays right ankle and foot standing  Work status: Retired     History and discussion of management with: His wife    This note was created using Dragon voice recognition software which may result in errors of speech and spelling recognition and word/phrase syntax errors.

## 2024-08-01 ENCOUNTER — OFFICE VISIT (OUTPATIENT)
Dept: ORTHOPEDIC SURGERY | Age: 79
End: 2024-08-01

## 2024-08-01 DIAGNOSIS — S82.61XG CLOSED DISPLACED FRACTURE OF LATERAL MALLEOLUS OF RIGHT FIBULA WITH DELAYED HEALING, SUBSEQUENT ENCOUNTER: ICD-10-CM

## 2024-08-01 DIAGNOSIS — S82.891G CLOSED FRACTURE OF RIGHT ANKLE WITH DELAYED HEALING, SUBSEQUENT ENCOUNTER: Primary | ICD-10-CM
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
## 2024-09-04 ENCOUNTER — TELEPHONE (OUTPATIENT)
Dept: ORTHOPEDIC SURGERY | Age: 79
End: 2024-09-04

## 2024-09-04 NOTE — TELEPHONE ENCOUNTER
Called pt to see how he was feeling and to ask if he'd like to reschedule his missed apt. Pt stated his wife cancelled that apt. He is feeling better and will call if ever needed.

## (undated) DEVICE — TROCAR: Brand: KII® OPTICAL ACCESS SYSTEM

## (undated) DEVICE — SOLUTION IV 1000ML 0.9% SOD CHL

## (undated) DEVICE — SEALER ENDOSCP L37CM NANO COAT BLNT TIP LAP DIV

## (undated) DEVICE — STRIP,CLOSURE,WOUND,MEDI-STRIP,1/2X4: Brand: MEDLINE

## (undated) DEVICE — [HIGH FLOW INSUFFLATOR,  DO NOT USE IF PACKAGE IS DAMAGED,  KEEP DRY,  KEEP AWAY FROM SUNLIGHT,  PROTECT FROM HEAT AND RADIOACTIVE SOURCES.]: Brand: PNEUMOSURE

## (undated) DEVICE — SUTURE SZ 0 27IN 5/8 CIR UR-6  TAPER PT VIOLET ABSRB VICRYL J603H

## (undated) DEVICE — NEEDLE HYPO 21GA L1.5IN INTRAMUSCULAR S STL LATCH BVL UP

## (undated) DEVICE — SUTURE VCRL SZ 4-0 L18IN ABSRB UD L19MM PS-2 3/8 CIR PRIM J496H

## (undated) DEVICE — TROCAR: Brand: KII® SLEEVE

## (undated) DEVICE — TROCAR: Brand: KII FIOS FIRST ENTRY

## (undated) DEVICE — GENERAL LAPAROSCOPY: Brand: MEDLINE INDUSTRIES, INC.

## (undated) DEVICE — REM POLYHESIVE ADULT PATIENT RETURN ELECTRODE: Brand: VALLEYLAB

## (undated) DEVICE — LOGICUT SCISSOR LENGTH 320MM: Brand: LOGI - LAPAROSCOPIC INSTRUMENT SYSTEM

## (undated) DEVICE — BUTTON SWITCH PENCIL BLADE ELECTRODE, HOLSTER: Brand: EDGE

## (undated) DEVICE — KIT,ANTI FOG,W/SPONGE & FLUID,SOFT PACK: Brand: MEDLINE

## (undated) DEVICE — (D)PREP SKN CHLRAPRP APPL 26ML -- CONVERT TO ITEM 371833

## (undated) DEVICE — 2, DISPOSABLE SUCTION/IRRIGATOR WITHOUT DISPOSABLE TIP: Brand: STRYKEFLOW

## (undated) DEVICE — GARMENT,MEDLINE,DVT,INT,CALF,MED, GEN2: Brand: MEDLINE

## (undated) DEVICE — BAG SPEC REM 224ML W4XL6IN DIA10MM 1 HND GYN DISP ENDOPCH

## (undated) DEVICE — CUTTER ENDOSCP L340MM LIN ARTC SGL STROKE FIRING ENDOPATH

## (undated) DEVICE — RELOAD STPL SZ 0 L45MM DIA3.5MM 0DEG STD REG TISS BLU TI

## (undated) DEVICE — DRAPE TWL SURG 16X26IN BLU ORB04] ALLCARE INC]

## (undated) DEVICE — SOLUTION IRRIG 3000ML 0.9% SOD CHL FLX CONT 0797208] ICU MEDICAL INC]

## (undated) DEVICE — CONTAINER SPEC FRMLN 120ML --

## (undated) DEVICE — INSUFFLATION NEEDLE TO ESTABLISH PNEUMOPERITONEUM.: Brand: INSUFFLATION NEEDLE